# Patient Record
Sex: MALE | Race: WHITE | Employment: FULL TIME | URBAN - METROPOLITAN AREA
[De-identification: names, ages, dates, MRNs, and addresses within clinical notes are randomized per-mention and may not be internally consistent; named-entity substitution may affect disease eponyms.]

---

## 2018-08-16 PROBLEM — D37.4 NEOPLASM OF UNCERTAIN BEHAVIOR OF COLON: Status: ACTIVE | Noted: 2018-08-16

## 2018-08-16 PROBLEM — K64.4 RESIDUAL HEMORRHOIDAL SKIN TAGS: Status: ACTIVE | Noted: 2018-08-16

## 2019-08-14 ENCOUNTER — HOSPITAL ENCOUNTER (OUTPATIENT)
Age: 59
Setting detail: OUTPATIENT SURGERY
Discharge: HOME | End: 2019-08-14
Attending: ORTHOPAEDIC SURGERY | Admitting: ORTHOPAEDIC SURGERY
Payer: COMMERCIAL

## 2019-08-14 ENCOUNTER — ANESTHESIA EVENT (OUTPATIENT)
Dept: SURGERY | Age: 59
End: 2019-08-14
Payer: COMMERCIAL

## 2019-08-14 ENCOUNTER — ANESTHESIA (OUTPATIENT)
Dept: SURGERY | Age: 59
End: 2019-08-14
Payer: COMMERCIAL

## 2019-08-14 VITALS
DIASTOLIC BLOOD PRESSURE: 77 MMHG | TEMPERATURE: 97.5 F | BODY MASS INDEX: 25.84 KG/M2 | WEIGHT: 175 LBS | HEART RATE: 70 BPM | SYSTOLIC BLOOD PRESSURE: 122 MMHG | RESPIRATION RATE: 16 BRPM | OXYGEN SATURATION: 92 %

## 2019-08-14 PROCEDURE — 77030000032 HC CUF TRNQT ZIMM -B: Performed by: ORTHOPAEDIC SURGERY

## 2019-08-14 PROCEDURE — 76210000020 HC REC RM PH II FIRST 0.5 HR: Performed by: ORTHOPAEDIC SURGERY

## 2019-08-14 PROCEDURE — 77030002933 HC SUT MCRYL J&J -A: Performed by: ORTHOPAEDIC SURGERY

## 2019-08-14 PROCEDURE — 77030003666 HC NDL SPINAL BD -A: Performed by: ORTHOPAEDIC SURGERY

## 2019-08-14 PROCEDURE — 77030010509 HC AIRWY LMA MSK TELE -A: Performed by: ANESTHESIOLOGY

## 2019-08-14 PROCEDURE — 77030018836 HC SOL IRR NACL ICUM -A: Performed by: ORTHOPAEDIC SURGERY

## 2019-08-14 PROCEDURE — 77030038012 HC WND COBLATN S&N -F: Performed by: ORTHOPAEDIC SURGERY

## 2019-08-14 PROCEDURE — 77030019605: Performed by: ORTHOPAEDIC SURGERY

## 2019-08-14 PROCEDURE — 74011250636 HC RX REV CODE- 250/636: Performed by: ORTHOPAEDIC SURGERY

## 2019-08-14 PROCEDURE — 74011250637 HC RX REV CODE- 250/637: Performed by: ANESTHESIOLOGY

## 2019-08-14 PROCEDURE — 74011250636 HC RX REV CODE- 250/636: Performed by: ANESTHESIOLOGY

## 2019-08-14 PROCEDURE — 76210000006 HC OR PH I REC 0.5 TO 1 HR: Performed by: ORTHOPAEDIC SURGERY

## 2019-08-14 PROCEDURE — 74011000250 HC RX REV CODE- 250

## 2019-08-14 PROCEDURE — 74011250636 HC RX REV CODE- 250/636

## 2019-08-14 PROCEDURE — 76010000138 HC OR TIME 0.5 TO 1 HR: Performed by: ORTHOPAEDIC SURGERY

## 2019-08-14 PROCEDURE — 77030006668 HC BLD SHV MENSCS STRY -B: Performed by: ORTHOPAEDIC SURGERY

## 2019-08-14 PROCEDURE — 76060000032 HC ANESTHESIA 0.5 TO 1 HR: Performed by: ORTHOPAEDIC SURGERY

## 2019-08-14 RX ORDER — SODIUM CHLORIDE 0.9 % (FLUSH) 0.9 %
5-40 SYRINGE (ML) INJECTION EVERY 8 HOURS
Status: DISCONTINUED | OUTPATIENT
Start: 2019-08-14 | End: 2019-08-14 | Stop reason: HOSPADM

## 2019-08-14 RX ORDER — LIDOCAINE HYDROCHLORIDE 20 MG/ML
INJECTION, SOLUTION EPIDURAL; INFILTRATION; INTRACAUDAL; PERINEURAL AS NEEDED
Status: DISCONTINUED | OUTPATIENT
Start: 2019-08-14 | End: 2019-08-14 | Stop reason: HOSPADM

## 2019-08-14 RX ORDER — NALOXONE HYDROCHLORIDE 0.4 MG/ML
0.2 INJECTION, SOLUTION INTRAMUSCULAR; INTRAVENOUS; SUBCUTANEOUS AS NEEDED
Status: DISCONTINUED | OUTPATIENT
Start: 2019-08-14 | End: 2019-08-14 | Stop reason: HOSPADM

## 2019-08-14 RX ORDER — EPHEDRINE SULFATE 50 MG/ML
INJECTION, SOLUTION INTRAVENOUS AS NEEDED
Status: DISCONTINUED | OUTPATIENT
Start: 2019-08-14 | End: 2019-08-14 | Stop reason: HOSPADM

## 2019-08-14 RX ORDER — CLINDAMYCIN PHOSPHATE 900 MG/50ML
900 INJECTION INTRAVENOUS ONCE
Status: COMPLETED | OUTPATIENT
Start: 2019-08-14 | End: 2019-08-14

## 2019-08-14 RX ORDER — SODIUM CHLORIDE 0.9 % (FLUSH) 0.9 %
5-40 SYRINGE (ML) INJECTION AS NEEDED
Status: DISCONTINUED | OUTPATIENT
Start: 2019-08-14 | End: 2019-08-14 | Stop reason: HOSPADM

## 2019-08-14 RX ORDER — SODIUM CHLORIDE, SODIUM LACTATE, POTASSIUM CHLORIDE, CALCIUM CHLORIDE 600; 310; 30; 20 MG/100ML; MG/100ML; MG/100ML; MG/100ML
75 INJECTION, SOLUTION INTRAVENOUS CONTINUOUS
Status: DISCONTINUED | OUTPATIENT
Start: 2019-08-14 | End: 2019-08-14 | Stop reason: HOSPADM

## 2019-08-14 RX ORDER — ROPIVACAINE HYDROCHLORIDE 5 MG/ML
INJECTION, SOLUTION EPIDURAL; INFILTRATION; PERINEURAL AS NEEDED
Status: DISCONTINUED | OUTPATIENT
Start: 2019-08-14 | End: 2019-08-14 | Stop reason: HOSPADM

## 2019-08-14 RX ORDER — DEXAMETHASONE SODIUM PHOSPHATE 4 MG/ML
INJECTION, SOLUTION INTRA-ARTICULAR; INTRALESIONAL; INTRAMUSCULAR; INTRAVENOUS; SOFT TISSUE AS NEEDED
Status: DISCONTINUED | OUTPATIENT
Start: 2019-08-14 | End: 2019-08-14 | Stop reason: HOSPADM

## 2019-08-14 RX ORDER — FENTANYL CITRATE 50 UG/ML
INJECTION, SOLUTION INTRAMUSCULAR; INTRAVENOUS AS NEEDED
Status: DISCONTINUED | OUTPATIENT
Start: 2019-08-14 | End: 2019-08-14 | Stop reason: HOSPADM

## 2019-08-14 RX ORDER — MIDAZOLAM HYDROCHLORIDE 1 MG/ML
2 INJECTION, SOLUTION INTRAMUSCULAR; INTRAVENOUS
Status: DISCONTINUED | OUTPATIENT
Start: 2019-08-14 | End: 2019-08-14 | Stop reason: HOSPADM

## 2019-08-14 RX ORDER — HYDROMORPHONE HYDROCHLORIDE 2 MG/ML
0.5 INJECTION, SOLUTION INTRAMUSCULAR; INTRAVENOUS; SUBCUTANEOUS
Status: DISCONTINUED | OUTPATIENT
Start: 2019-08-14 | End: 2019-08-14 | Stop reason: HOSPADM

## 2019-08-14 RX ORDER — PROPOFOL 10 MG/ML
INJECTION, EMULSION INTRAVENOUS AS NEEDED
Status: DISCONTINUED | OUTPATIENT
Start: 2019-08-14 | End: 2019-08-14 | Stop reason: HOSPADM

## 2019-08-14 RX ORDER — OXYCODONE AND ACETAMINOPHEN 5; 325 MG/1; MG/1
1 TABLET ORAL AS NEEDED
Status: DISCONTINUED | OUTPATIENT
Start: 2019-08-14 | End: 2019-08-14 | Stop reason: HOSPADM

## 2019-08-14 RX ORDER — ONDANSETRON 2 MG/ML
INJECTION INTRAMUSCULAR; INTRAVENOUS AS NEEDED
Status: DISCONTINUED | OUTPATIENT
Start: 2019-08-14 | End: 2019-08-14 | Stop reason: HOSPADM

## 2019-08-14 RX ORDER — LIDOCAINE HYDROCHLORIDE 10 MG/ML
0.1 INJECTION INFILTRATION; PERINEURAL AS NEEDED
Status: DISCONTINUED | OUTPATIENT
Start: 2019-08-14 | End: 2019-08-14 | Stop reason: HOSPADM

## 2019-08-14 RX ADMIN — EPHEDRINE SULFATE 5 MG: 50 INJECTION, SOLUTION INTRAVENOUS at 07:40

## 2019-08-14 RX ADMIN — PROPOFOL 200 MG: 10 INJECTION, EMULSION INTRAVENOUS at 07:19

## 2019-08-14 RX ADMIN — FENTANYL CITRATE 50 MCG: 50 INJECTION, SOLUTION INTRAMUSCULAR; INTRAVENOUS at 07:19

## 2019-08-14 RX ADMIN — FENTANYL CITRATE 25 MCG: 50 INJECTION, SOLUTION INTRAMUSCULAR; INTRAVENOUS at 07:47

## 2019-08-14 RX ADMIN — FENTANYL CITRATE 25 MCG: 50 INJECTION, SOLUTION INTRAMUSCULAR; INTRAVENOUS at 07:32

## 2019-08-14 RX ADMIN — LIDOCAINE HYDROCHLORIDE 60 MG: 20 INJECTION, SOLUTION EPIDURAL; INFILTRATION; INTRACAUDAL; PERINEURAL at 07:19

## 2019-08-14 RX ADMIN — ONDANSETRON 4 MG: 2 INJECTION INTRAMUSCULAR; INTRAVENOUS at 07:37

## 2019-08-14 RX ADMIN — CLINDAMYCIN PHOSPHATE 900 MG: 900 INJECTION, SOLUTION INTRAVENOUS at 07:25

## 2019-08-14 RX ADMIN — DEXAMETHASONE SODIUM PHOSPHATE 10 MG: 4 INJECTION, SOLUTION INTRA-ARTICULAR; INTRALESIONAL; INTRAMUSCULAR; INTRAVENOUS; SOFT TISSUE at 07:27

## 2019-08-14 RX ADMIN — OXYCODONE HYDROCHLORIDE AND ACETAMINOPHEN 1 TABLET: 5; 325 TABLET ORAL at 08:21

## 2019-08-14 RX ADMIN — SODIUM CHLORIDE, SODIUM LACTATE, POTASSIUM CHLORIDE, AND CALCIUM CHLORIDE 75 ML/HR: 600; 310; 30; 20 INJECTION, SOLUTION INTRAVENOUS at 06:15

## 2019-08-14 NOTE — ANESTHESIA PREPROCEDURE EVALUATION
Relevant Problems   No relevant active problems       Anesthetic History               Review of Systems / Medical History  Patient summary reviewed, nursing notes reviewed and pertinent labs reviewed    Pulmonary  Within defined limits                 Neuro/Psych              Cardiovascular  Within defined limits                Exercise tolerance: >4 METS     GI/Hepatic/Renal  Within defined limits              Endo/Other  Within defined limits           Other Findings              Physical Exam    Airway  Mallampati: II  TM Distance: 4 - 6 cm  Neck ROM: normal range of motion   Mouth opening: Normal     Cardiovascular    Rhythm: regular           Dental  No notable dental hx       Pulmonary  Breath sounds clear to auscultation               Abdominal         Other Findings            Anesthetic Plan    ASA: 1  Anesthesia type: general            Anesthetic plan and risks discussed with: Patient and Spouse

## 2019-08-14 NOTE — H&P
Outpatient Surgery History and Physical:  Mendel Garnet Frames was seen and examined. CHIEF COMPLAINT:   Left knee pain. PE:     Visit Vitals  /76 (BP 1 Location: Right arm, BP Patient Position: Sitting)   Pulse (!) 55   Temp 98 °F (36.7 °C)   Resp 18   Wt 79.4 kg (175 lb)   SpO2 96%   BMI 25.84 kg/m²       Heart:   Regular rhythm      Lungs:  Are clear      Past Medical History:    Patient Active Problem List    Diagnosis    Residual hemorrhoidal skin tags    Neoplasm of uncertain behavior of colon    Allergic rhinitis, seasonal    Testicular asymmetry     right      Colon polyp    Melanoma of plantar aspect of foot ([address]     bilateral      H/O psoriasis     treated with Embrel and resolved         Surgical History:   Past Surgical History:   Procedure Laterality Date    HX COLONOSCOPY  2016    repeat 2021    HX COLONOSCOPY      HX MALIGNANT SKIN LESION EXCISION  2015    plantar    HX WISDOM TEETH EXTRACTION         Social History: Patient  reports that he has never smoked. He has never used smokeless tobacco. He reports that he does not drink alcohol or use drugs. Family History:   Family History   Problem Relation Age of Onset    Pacemaker Mother     Thyroid Disease Mother     Heart Surgery Father         CABG    Stroke Father     No Known Problems Sister     Heart Surgery Brother 37        CABG    No Known Problems Sister     Colon Cancer Neg Hx     Prostate Cancer Neg Hx     Breast Cancer Neg Hx        Allergies: Reviewed per EMR  Allergies   Allergen Reactions    Shellfish Derived Anaphylaxis    Penicillins Rash       Medications:    No current facility-administered medications on file prior to encounter. Current Outpatient Medications on File Prior to Encounter   Medication Sig    diclofenac EC (VOLTAREN) 75 mg EC tablet Take 1 Tab by mouth two (2) times a day.  cetirizine (ZYRTEC) 10 mg tablet Take  by mouth.     diphenhydrAMINE (BENADRYL) 25 mg capsule Take 25 mg by mouth every six (6) hours as needed.  triamcinolone acetonide (KENALOG) 0.1 % topical cream Apply  to affected area two (2) times daily as needed for Skin Irritation. use thin layer   Indications: PLAQUE PSORIASIS    LORATADINE (CLARITIN PO) Take  by mouth. The surgery is planned for the left knee arthroscopy with partial medial menisectomy. History and physical has been reviewed. The patient has been examined. There have been no significant clinical changes since the completion of the originally dated History and Physical.  Patient identified by surgeon; surgical site was confirmed by patient and surgeon. The patient is here today for outpatient surgery. I have examined the patient, no changes are noted in the patient's medical status. Necessity for the procedure/care is still present and the history and physical above is current. See the office notes for the full long term history of the problem. Please see the recent office notes for the musculoskeletal examination.     Signed By: Gilberto Rich MD     August 14, 2019 7:02 AM

## 2019-08-14 NOTE — ANESTHESIA POSTPROCEDURE EVALUATION
Procedure(s):  LEFT KNEE ARTHROSCOPY / PARTIAL MEDIAL MENISCECTOMY.     general    Anesthesia Post Evaluation      Multimodal analgesia: multimodal analgesia used between 6 hours prior to anesthesia start to PACU discharge  Patient location during evaluation: PACU  Patient participation: complete - patient participated  Level of consciousness: awake and alert  Pain management: adequate  Airway patency: patent  Anesthetic complications: no  Cardiovascular status: acceptable  Respiratory status: acceptable  Hydration status: acceptable  Post anesthesia nausea and vomiting:  none      Vitals Value Taken Time   /77 8/14/2019  8:45 AM   Temp 36.4 °C (97.5 °F) 8/14/2019  8:03 AM   Pulse 70 8/14/2019  8:45 AM   Resp 16 8/14/2019  8:45 AM   SpO2 92 % 8/14/2019  8:45 AM

## 2019-08-14 NOTE — OP NOTES
Operative Note    8/14/2019     Preoperative diagnosis:  Degenerative tear of left medial meniscus [M23.204]  Loose body of left knee [M23.42]  Swelling of left knee joint [M25.462]    Postoperative diagnosis: PARTIAL MEDIAL MENISCUS TEAR    Surgeon(s) and Role:     Anibal Sandy MD - Primary     Assistant: none      Anesthesia: General    Tourniquet Time:   Total Tourniquet Time Documented:  Thigh (Left) - 22 minutes  Total: Thigh (Left) - 22 minutes     At 250 mmHg. Antibiotics: cleocin 900 mg IV    Procedures:  Procedure(s):  LEFT KNEE ARTHROSCOPY / PARTIAL MEDIAL MENISCECTOMY/CHONDROPLASTY  04094    Findings:  1. EUA -   - lachman's and - pivot shift. Stable to varus and valgus. 2. PFJ - Normal, The medial and lateral gutters did have some small chondral debrie. 3. Medial Joint - complex medial meniscus tear, cartilage appeared fairly normal on the tibia but the weightbearing medial femoral condyle was noted to have GII-III changes  4. Lateral joint - The lateral joint carilage appeared normal except for a small area of GI change on the medial most lateral tibial plateau. The lateral meniscus was stable to probing and showed no tears present. 5. PCL - stable and intact  6. ACL -  stable and intact   7. No loose bodies. Indications / Consent:   this is a patient with symptoms compatible with a medial meniscus tear. After previous discussions and treatments using both conservative and/or non-operative treatment options the patient elected to proceed with surgery due to continued symptoms. A review of the risks and benefits, including but not limited to infection, stiffness, injury to nerves and vessels, DVT, PE, MI, need for further operations and other anesthesia related risks was performed with the patient. After this review and the review of the likely outcome and potential complications of the procedure, preoperative verbal and written consents were obtained.   The operative procedure and postoperative course were discussed with the patient in detail and the extremity was marked by the patient and myself. Procedure:     the patient was given their anesthetic, placed in a supine position, an EUA was performed and noted above. A lateral post was placed adjacent to the operative leg. The contralateral leg was padded appropriately and lay on the operating table. The surgical leg was prepped with ChloraPrep and draped in the standard fashion. Prior to the beginning of the procedure, a time-out was performed for correct surgical site identification as was marked during the pre-operative meeting. This was confirmed using the written consent and history/physical. Time-out for antibiotic dosing, timing and selection was also performed. An esmarch was used to exsanguinate the leg and the tourniquet was inflated to 250 mmhg. An incision was made and the scope was introduced anterolaterally and an anteromedial portal was developed with the use of spinal needle localization. The patellofemoral joint was viewed and the articular surfaces were normal.  The medial and lateral gutters were reviewed and they were noted to have small chondral debrie. The medial compartment was viewed and the articular surfaces were noted. A chondroplasty was performed on the medial femoral condyle. The medial meniscus was probed and a complex type tear was present. It involved 20% of the meniscus. The notch was viewed and the ACL and PCL were normal.  The lateral compartment was viewed and probed and the articular surface and lateral meniscus was normal.  Attention was turned back to the medial compartment. The medial meniscus was resected and balanced with manual instruments and motorized alessia until the rim was probed and felt to be stable. The posteromedial compartment was viewed and no further abnormalities were noted. The scope was then placed superiorly.   Any other loose bits of meniscal debris were removed from the joint. Local anesthetic was injected, 15 ml of Naropin, at the portal sites and intra-articularly. Monofilament sutures were used to close the portals and a sterile Allevyn dressing and an Ace wrap were placed on the knee. The tourniquet was deflated at  22 min. The patient was returned to the recovery room in a satisfactory condition. Post-operative plan: Patient will work on ROM and may progress weightbearing as they feel comfortable. They will start PT for 2 or more visits depending on their progress. Otherwise the patient may refer to post op instructions for wound care and progression of activities. Enteric Aspirin was discussed for DVT prophylaxis. Will follow up in 1 week for wound check and post op review. Pain medications have been provided. Estimated Blood Loss:   minimal    Fluids:    See anesthesia record.     Implant: * No implants in log *    Closure: Primary    Complications: None    Signed By: José Miguel Izaguirre MD

## 2019-08-14 NOTE — BRIEF OP NOTE
BRIEF OPERATIVE NOTE    Date of Procedure: 8/14/2019   Preoperative Diagnosis: Degenerative tear of left medial meniscus [M23.204]  Loose body of left knee [M23.42]  Swelling of left knee joint [M25.462]  Postoperative Diagnosis: left knee  PARTIAL MEDIAL MENISCUS TEAR    Procedure(s):  LEFT KNEE ARTHROSCOPY / PARTIAL MEDIAL MENISCECTOMY/CHONDROPLASTY  Surgeon(s) and Role:     Smooth Vargas MD - Primary         Surgical Assistant:     Surgical Staff:  Circ-1: Ilana Aguayo  Circ-2: Rob Ventura RN  Scrub Tech-1: Emile He  Event Time In Time Out   Incision Start 3787    Incision Close 0751      Anesthesia: General   Estimated Blood Loss: minimal  Specimens: * No specimens in log *   Findings: Left knee medial meniscus tear, chondromalacia.     Complications: none  Implants: * No implants in log *

## 2019-08-14 NOTE — DISCHARGE INSTRUCTIONS
Postoperative Instructions - Knee Arthroscopy    Please note these are general instructions for postoperative care. Specific instructions may be given regarding the type of surgery that you have undergone. 1. Postoperative dressings may be removed after 2 to 3 days. Dressings should be kept dry for the first several days. Following removal of the dressing, wounds should be kept dry when showering. A large trash bag taped to the thigh can work well. Do not soak wounds in the tub prior to suture removal.      2. All steri-strip tapes across the wound should be left in place if your incisions have them. 3. Some discomfort is expected following any operation. You will be given a prescription for pain medication along with instructions for its use. Many pain medications contain Tylenol. Do not take additional Tylenol if you are currently taking the prescribed pain medications. Do not drive while taking pain medications. Do not make important personal or business decisions or sign legal documents the first 24 hours after surgery. If your pain is not adequately controlled, contact your surgeon on call at the numbers on this sheet. 4. Following simple knee arthroscopy crutches are not usually utilized or are used for only 1 or 2 days. Working on regaining full motion of the knee is encouraged. Bending and straightening the knee and performing ankle range of motion is encouraged to prevent blood clots. Try to use a stationary bike without resistance within the first week of surgery to help regain motion. 5. Elevating the lower extremity after surgery is helpful in the first few days postoperatively. This can help control swelling. Applying ice for 15 to 20 minutes per hour to the surgical site is often helpful in decreasing pain and swelling.     6. As pain subsides, discontinuation of narcotic medication is recommended and switching to Tylenol or over the counter anti-inflammatory medication is desirable. 7. Pain medications can cause constipation, nausea and vomiting, and sometimes itching and hives. Keeping hydrated by taking liquids on a regular basis can help. For constipation, consider over the counter additions like Metamucil or an over-the-counter stool softner. Trying to limit narcotic use can often help with regards to nausea and vomiting. Taking pain medication with a small amount of food is also usually helpful. Itching and hives can occur with pain medication as well as antibiotics that are given during the felecia-operative time. Over the counter Benadryl (25mg every 6 hours) can be helpful in treating itching. 8. If you feel you are progressing slowly, feel free to call our office to get you into physical therapy if you do not already have a prescription. 9. Any fevers (101º or greater) after 2-3 days post-op, increasing redness, drainage, or steadily increasing pain please notify us or come in as soon as possible. 10. Deep vein thrombosis (DVT) is a condition in which a blood clot has formed in a deep vein of the leg. This may result in redness, swelling, warmth and/or pain of the leg. Although these are very rare, there are things that can be done to lessen the risk. - It is recommended by your surgeon unless indicated otherwise, after knee arthroscopy; take an adult aspirin once a day for three weeks after surgery to help prevent the formation of blood clots. (Do not take aspirin against the advice of your medical doctor). -   For several days (at least until you are walking about most of the day), rotate your  ankle, bend your toes and move your foot back and forth and from side to side, frequently (at least a few minutes every hour while you are awake) in order to keep blood circulation flowing so that the blood clotting will be less likely to occur. The more circulation, the less chance of blood clotting and a DVT.     If you call the office please have us paged instead of leaving a voice mail so that we can immediately take care of your needs. Phone [phone]              Fax [phone]                               ACTIVITY  · As tolerated and as directed by your doctor. · Bathe or shower as directed by your doctor. DIET  · Clear liquids until no nausea or vomiting; then light diet for the first day. · Advance to regular diet on second day, unless your doctor orders otherwise. · If nausea and vomiting continues, call your doctor. PAIN  · Take pain medication as directed by your doctor. · Call your doctor if pain is NOT relieved by medication. · DO NOT take aspirin of blood thinners unless directed by your doctor. DRESSING CARE       CALL YOUR DOCTOR IF   · Excessive bleeding that does not stop after holding pressure over the area  · Temperature of 101 degrees F or above  · Excessive redness, swelling or bruising, and/ or green or yellow, smelly discharge from incision    AFTER ANESTHESIA   · For the first 24 hours: DO NOT Drive, Drink alcoholic beverages, or Make important decisions. · Be aware of dizziness following anesthesia and while taking pain medication. APPOINTMENT DATE/ TIME    YOUR DOCTOR'S PHONE NUMBER       DISCHARGE SUMMARY from Nurse    PATIENT INSTRUCTIONS:    After general anesthesia or intravenous sedation, for 24 hours or while taking prescription Narcotics:  · Limit your activities  · Do not drive and operate hazardous machinery  · Do not make important personal or business decisions  · Do  not drink alcoholic beverages  · If you have not urinated within 8 hours after discharge, please contact your surgeon on call. *  Please give a list of your current medications to your Primary Care Provider. *  Please update this list whenever your medications are discontinued, doses are      changed, or new medications (including over-the-counter products) are added.     *  Please carry medication information at all times in case of emergency situations. These are general instructions for a healthy lifestyle:    No smoking/ No tobacco products/ Avoid exposure to second hand smoke    Surgeon General's Warning:  Quitting smoking now greatly reduces serious risk to your health. Obesity, smoking, and sedentary lifestyle greatly increases your risk for illness    A healthy diet, regular physical exercise & weight monitoring are important for maintaining a healthy lifestyle    You may be retaining fluid if you have a history of heart failure or if you experience any of the following symptoms:  Weight gain of 3 pounds or more overnight or 5 pounds in a week, increased swelling in our hands or feet or shortness of breath while lying flat in bed. Please call your doctor as soon as you notice any of these symptoms; do not wait until your next office visit. Recognize signs and symptoms of STROKE:    F-face looks uneven    A-arms unable to move or move unevenly    S-speech slurred or non-existent    T-time-call 911 as soon as signs and symptoms begin-DO NOT go       Back to bed or wait to see if you get better-TIME IS BRAIN.

## 2022-03-19 PROBLEM — K64.4 RESIDUAL HEMORRHOIDAL SKIN TAGS: Status: ACTIVE | Noted: 2018-08-16

## 2022-03-19 PROBLEM — D37.4 NEOPLASM OF UNCERTAIN BEHAVIOR OF COLON: Status: ACTIVE | Noted: 2018-08-16

## 2023-09-29 ENCOUNTER — OFFICE VISIT (OUTPATIENT)
Dept: ORTHOPEDIC SURGERY | Age: 63
End: 2023-09-29

## 2023-09-29 DIAGNOSIS — M05.742 RHEUMATOID ARTHRITIS INVOLVING BOTH HANDS WITH POSITIVE RHEUMATOID FACTOR (HCC): ICD-10-CM

## 2023-09-29 DIAGNOSIS — M65.341 TRIGGER FINGER, RIGHT RING FINGER: Primary | ICD-10-CM

## 2023-09-29 DIAGNOSIS — M65.342 TRIGGER FINGER, LEFT RING FINGER: ICD-10-CM

## 2023-09-29 DIAGNOSIS — M05.741 RHEUMATOID ARTHRITIS INVOLVING BOTH HANDS WITH POSITIVE RHEUMATOID FACTOR (HCC): ICD-10-CM

## 2023-09-29 RX ORDER — MELOXICAM 15 MG/1
15 TABLET ORAL DAILY
Qty: 30 TABLET | Refills: 0 | Status: SHIPPED | OUTPATIENT
Start: 2023-09-29 | End: 2023-10-29

## 2023-09-29 RX ORDER — BETAMETHASONE SODIUM PHOSPHATE AND BETAMETHASONE ACETATE 3; 3 MG/ML; MG/ML
6 INJECTION, SUSPENSION INTRA-ARTICULAR; INTRALESIONAL; INTRAMUSCULAR; SOFT TISSUE ONCE
Status: COMPLETED | OUTPATIENT
Start: 2023-09-29 | End: 2023-09-29

## 2023-09-29 RX ADMIN — BETAMETHASONE SODIUM PHOSPHATE AND BETAMETHASONE ACETATE 6 MG: 3; 3 INJECTION, SUSPENSION INTRA-ARTICULAR; INTRALESIONAL; INTRAMUSCULAR; SOFT TISSUE at 08:05

## 2023-09-29 NOTE — PROGRESS NOTES
Orthopaedic Hand Surgery Note    Name: Lilli Moseley  Age: 60 y.o. YOB: 1960  Gender: male  MRN: [id number]    CC: New patient referred for hand numbness    HPI: Patient is a 60 y.o. male right hand dominant with a chief complaint of bilateral ring clicking, locking and pain. The symptoms have been going on for at least 6 months ago, patient does have a diagnosis of rheumatoid arthritis treated by Dr. Mateo Cain with sulfasalazine, methotrexate and recently started Enbrel. The current symptoms are rated a 7/10 and interfere with ADLs. ROS/Meds/PSH/PMH/FH/SH: I personally reviewed the patients standard intake form. Pertinents are discussed in the HPI    Physical Examination:  General: Awake and alert. HEENT: Normocephalic, atraumatic  CV/Pulm: Breathing even and unlabored  Skin: No obvious rashes noted. Lymphatic: No obvious evidence of lymphedema or lymphadenopathy    Musculoskeletal:   Examination on the bilateral demonstrates Normal sensation to light touch in the median distribution, normal sensation in ulnar and radial distribution, Negative carpal tunnel compression testing and Phalen testing. Positive tenderness of the ring A1 pulley with palpable clicking and Positive  locking. The extensor tendons all track well over the MCP joints. Imaging / Electrodiagnostic Tests:     none    Assessment:   1. Trigger finger, right ring finger    2. Trigger finger, left ring finger    3. Rheumatoid arthritis involving both hands with positive rheumatoid factor ([address])        Plan:  We discussed the diagnosis and different treatment options. We discussed observation, splinting, cortisone injections and surgical release of the A1 pulley.  We discussed that stenosing tenosynovitis at the level of the A1 pulley AKA trigger finger is a chronic condition regardless of how long the symptoms have been present, this most likely has been progressing for much longer than the symptoms were evident and it will

## 2023-11-29 ENCOUNTER — OFFICE VISIT (OUTPATIENT)
Dept: ORTHOPEDIC SURGERY | Age: 63
End: 2023-11-29
Payer: COMMERCIAL

## 2023-11-29 DIAGNOSIS — M05.741 RHEUMATOID ARTHRITIS INVOLVING BOTH HANDS WITH POSITIVE RHEUMATOID FACTOR (HCC): ICD-10-CM

## 2023-11-29 DIAGNOSIS — M65.341 TRIGGER FINGER, RIGHT RING FINGER: Primary | ICD-10-CM

## 2023-11-29 DIAGNOSIS — M65.342 TRIGGER FINGER, LEFT RING FINGER: ICD-10-CM

## 2023-11-29 DIAGNOSIS — M05.742 RHEUMATOID ARTHRITIS INVOLVING BOTH HANDS WITH POSITIVE RHEUMATOID FACTOR (HCC): ICD-10-CM

## 2023-11-29 PROCEDURE — 99213 OFFICE O/P EST LOW 20 MIN: CPT | Performed by: ORTHOPAEDIC SURGERY

## 2023-11-29 NOTE — PROGRESS NOTES
Orthopaedic Hand Surgery Note    Name: Aidee Moseley  Age: 60 y.o. YOB: 1960  Gender: male  MRN: [id number]    CC: Follow up for trigger finger    HPI: Patient is a 60 y.o. male who returns today for reevaluation of a bilateral ring trigger finger. Last visit we provided steroid injections, patient reports symptoms improved to 100%, the left ring finger is bothering him a little bit but it has not locked or clicked. ROS/Meds/PSH/PMH/FH/SH: I personally reviewed the patients standard intake form. Pertinents are discussed in the HPI    Physical Examination:  General: Awake and alert. HEENT: Normocephalic, atraumatic  CV/Pulm: Breathing even and unlabored  Skin: No obvious rashes noted. Lymphatic: No obvious evidence of lymphedema or lymphadenopathy    Musculoskeletal:   Examination on the bilateral upper extremity demonstrates, Normal sensation and good cap refill in all fingers, Positive tenderness over the left ring A1 pulley with palpable clicking and Negative  locking. Imaging / Electrodiagnostic Tests:     none    Assessment:   1. Trigger finger, right ring finger    2. Trigger finger, left ring finger    3. Rheumatoid arthritis involving both hands with positive rheumatoid factor ([address])        Plan:  We discussed the diagnosis and different treatment options. We discussed observation, splinting, cortisone injections and surgical release of the A1 pulley. We discussed that stenosing tenosynovitis at the level of the A1 pulley AKA trigger finger is a chronic condition regardless of how long the symptoms have been present, this most likely has been progressing for much longer than the symptoms were evident and it will likely persist for a long time without medical treatment. Furthermore, the vast majority of patients require surgical release at some point despite some short-term benefits of conservative treatment.   After discussing in detail the patient elects to proceed with

## 2024-01-24 ENCOUNTER — HOSPITAL ENCOUNTER (EMERGENCY)
Age: 64
Discharge: HOME | End: 2024-01-24
Payer: COMMERCIAL

## 2024-01-24 ENCOUNTER — APPOINTMENT (OUTPATIENT)
Dept: GENERAL RADIOLOGY | Age: 64
End: 2024-01-24
Payer: COMMERCIAL

## 2024-01-24 VITALS
BODY MASS INDEX: 29.03 KG/M2 | WEIGHT: 185 LBS | SYSTOLIC BLOOD PRESSURE: 98 MMHG | OXYGEN SATURATION: 96 % | HEIGHT: 67 IN | TEMPERATURE: 97.3 F | DIASTOLIC BLOOD PRESSURE: 55 MMHG | RESPIRATION RATE: 18 BRPM | HEART RATE: 54 BPM

## 2024-01-24 DIAGNOSIS — S42.202A CLOSED FRACTURE OF PROXIMAL END OF LEFT HUMERUS, UNSPECIFIED FRACTURE MORPHOLOGY, INITIAL ENCOUNTER: Primary | ICD-10-CM

## 2024-01-24 PROCEDURE — 73030 X-RAY EXAM OF SHOULDER: CPT

## 2024-01-24 PROCEDURE — 99283 EMERGENCY DEPT VISIT LOW MDM: CPT

## 2024-01-24 PROCEDURE — 6370000000 HC RX 637 (ALT 250 FOR IP)

## 2024-01-24 RX ORDER — ONDANSETRON 4 MG/1
4 TABLET, ORALLY DISINTEGRATING ORAL 3 TIMES DAILY PRN
Qty: 20 TABLET | Refills: 0 | Status: SHIPPED | OUTPATIENT
Start: 2024-01-24

## 2024-01-24 RX ORDER — OXYCODONE HYDROCHLORIDE AND ACETAMINOPHEN 5; 325 MG/1; MG/1
1 TABLET ORAL EVERY 6 HOURS PRN
Qty: 20 TABLET | Refills: 0 | Status: ON HOLD | OUTPATIENT
Start: 2024-01-24 | End: 2024-01-26

## 2024-01-24 RX ORDER — ONDANSETRON 4 MG/1
4 TABLET, ORALLY DISINTEGRATING ORAL
Status: COMPLETED | OUTPATIENT
Start: 2024-01-24 | End: 2024-01-24

## 2024-01-24 RX ORDER — OXYCODONE HYDROCHLORIDE AND ACETAMINOPHEN 5; 325 MG/1; MG/1
1 TABLET ORAL
Status: COMPLETED | OUTPATIENT
Start: 2024-01-24 | End: 2024-01-24

## 2024-01-24 RX ORDER — FOLIC ACID 1 MG/1
1 TABLET ORAL DAILY
COMMUNITY

## 2024-01-24 RX ADMIN — OXYCODONE AND ACETAMINOPHEN 1 TABLET: 5; 325 TABLET ORAL at 17:10

## 2024-01-24 RX ADMIN — ONDANSETRON 4 MG: 4 TABLET, ORALLY DISINTEGRATING ORAL at 17:11

## 2024-01-24 ASSESSMENT — ENCOUNTER SYMPTOMS
NAUSEA: 0
ABDOMINAL PAIN: 0
DIARRHEA: 0
COLOR CHANGE: 0
CHEST TIGHTNESS: 0
SHORTNESS OF BREATH: 0
VOMITING: 0
WHEEZING: 0

## 2024-01-24 ASSESSMENT — PAIN DESCRIPTION - LOCATION
LOCATION: SHOULDER
LOCATION: ARM;SHOULDER

## 2024-01-24 ASSESSMENT — PAIN DESCRIPTION - ORIENTATION
ORIENTATION: LEFT
ORIENTATION: LEFT

## 2024-01-24 ASSESSMENT — PAIN SCALES - GENERAL
PAINLEVEL_OUTOF10: 9
PAINLEVEL_OUTOF10: 9

## 2024-01-24 ASSESSMENT — PAIN - FUNCTIONAL ASSESSMENT: PAIN_FUNCTIONAL_ASSESSMENT: 0-10

## 2024-01-24 ASSESSMENT — LIFESTYLE VARIABLES
HOW MANY STANDARD DRINKS CONTAINING ALCOHOL DO YOU HAVE ON A TYPICAL DAY: PATIENT DOES NOT DRINK
HOW OFTEN DO YOU HAVE A DRINK CONTAINING ALCOHOL: NEVER

## 2024-01-24 NOTE — ED PROVIDER NOTES
Swelling, tenderness and deformity present.      Cervical back: Normal range of motion and neck supple.      Comments: Significant soft tissue swelling noted to the left shoulder girdle and proximal humerus.  Patient unable to tolerate range of motion at the shoulder.  Still pulses and sensation are intact.   Skin:     General: Skin is warm and dry.   Neurological:      General: No focal deficit present.      Mental Status: He is alert and oriented to person, place, and time.   Psychiatric:         Mood and Affect: Mood normal.         Behavior: Behavior normal.          Procedures     Procedures    Orders Placed This Encounter   Procedures    XR SHOULDER LEFT (MIN 2 VIEWS)        Medications given during this emergency department visit:  Medications   oxyCODONE-acetaminophen (PERCOCET) 5-325 MG per tablet 1 tablet (1 tablet Oral Given 1/24/24 1710)   ondansetron (ZOFRAN-ODT) disintegrating tablet 4 mg (4 mg Oral Given 1/24/24 1711)       Discharge Medication List as of 1/24/2024  5:04 PM        START taking these medications    Details   ondansetron (ZOFRAN-ODT) 4 MG disintegrating tablet Take 1 tablet by mouth 3 times daily as needed for Nausea or Vomiting, Disp-20 tablet, R-0Normal      oxyCODONE-acetaminophen (PERCOCET) 5-325 MG per tablet Take 1 tablet by mouth every 6 hours as needed for Pain for up to 5 days. Intended supply: 3 days. Take lowest dose possible to manage pain Max Daily Amount: 4 tablets, Disp-20 tablet, R-0Normal              Past Medical History:   Diagnosis Date    Adverse effect of anesthesia     patient stated after peridontal work in the office, sedation, passed out at home    Allergic rhinitis     Allergic rhinitis, seasonal     Arthritis     Colon polyp 2016    Fungal infection     H/O psoriasis 2015    treated with Embrel and resolved    Hypercholesteremia     Melanoma of plantar aspect of foot ([facility]) 2015    bilateral    Normal cardiac stress test 2000    RA (rheumatoid arthritis) ([facility])

## 2024-01-24 NOTE — DISCHARGE INSTRUCTIONS
CC: RIGHT shoulder pain    Current HPI:  Patient presents today for follow-up evaluation of right shoulder pain.  She has been compliant with formal physical therapy for the past 2 months.  She also received a steroid injection in May which she states provided relief for only 1-2 weeks and has since worn off.  Patient has also tried topical Voltaren gel which only provides some relief.  She would like to discuss further management options.    Previous HPI (5/19/2020): 68 y.o. Female with a history of right shoulder pain of about 3 weeks in duration.  She states that the pain is severe and not responding to any conservative care.      She reports that the pain and weakness is worse with overhead activity and reaching behind. It also bothers her at night.    Is affecting ADLs.  Pain is 8/10 at it's worst. Currently 2-3/10    Denies any CSI or PT for the shoulder. She is RHD.    Hx of left shoulder arthroscopic RTC repair in 01/2010 by Dr. Pickens. No major complaints today      Past Medical History:   Diagnosis Date    Bronchitis     seasonal    Cataract     Diverticulitis     Dry eye syndrome     GERD (gastroesophageal reflux disease)     Hyperlipidemia     Hypertension     Hypothyroidism 7/19/2012    Obese     PONV (postoperative nausea and vomiting)     Thyroid disease        Past Surgical History:   Procedure Laterality Date    BACK SURGERY      CHOLECYSTECTOMY      COLONOSCOPY  2007    diverticulosis    DE QUERVAIN'S RELEASE Left 03/2017    HERNIA REPAIR      HYSTERECTOMY      NASAL SEPTUM SURGERY      SHOULDER SURGERY      TONSILLECTOMY         Family History   Problem Relation Age of Onset    Cataracts Mother     Breast cancer Mother     Diabetes Mother     Hypertension Mother     Heart failure Mother     Heart disease Mother     Cataracts Father     Hypertension Father     Stroke Father     No Known Problems Daughter     No Known Problems Son     Diabetes Paternal Grandmother  X-ray did reveal a proximal humerus fracture as discussed.  I have discussed your case with our orthopedic consultants of [facility].  Their office should call tomorrow to schedule an appointment with Dr. Murry.  His contact information is provided above should you not hear from them.  Prescription for pain and nausea medication sent to pharmacy as requested.  Please note the oxycodone may make you feel drowsy so do not take this if you plan to drive or go to work.    Please return with any worsening symptoms or concerns in the interim.       Hyperlipidemia Sister     Arthritis Sister     Hyperlipidemia Brother     Arthritis Brother     No Known Problems Son     Amblyopia Neg Hx     Blindness Neg Hx     Glaucoma Neg Hx     Macular degeneration Neg Hx     Retinal detachment Neg Hx     Strabismus Neg Hx     Ovarian cancer Neg Hx     Melanoma Neg Hx     Celiac disease Neg Hx     Colon cancer Neg Hx     Colon polyps Neg Hx     Esophageal cancer Neg Hx     Liver cancer Neg Hx     Liver disease Neg Hx     Rectal cancer Neg Hx     Stomach cancer Neg Hx          Current Outpatient Medications:     acetaminophen/diphenhydramine (TYLENOL PM ORAL), Take by mouth., Disp: , Rfl:     acetic acid-hydrocortisone (VOSOL-HC) otic solution, 2-4 drops to affected ear twice a day, Disp: 10 mL, Rfl: 2    albuterol 90 mcg/actuation inhaler, Inhale 2 puffs into the lungs every 6 (six) hours as needed for Wheezing., Disp: 6.7 g, Rfl: 11    amLODIPine (NORVASC) 2.5 MG tablet, TAKE ONE TABLET BY MOUTH EVERY DAY, Disp: 30 tablet, Rfl: 11    dicyclomine (BENTYL) 10 MG capsule, Take 1 capsule (10 mg total) by mouth 3 (three) times daily., Disp: 90 capsule, Rfl: 5    fluocinonide (LIDEX) 0.05 % external solution, APPLY TO SCALP ONCE DAILY AS NEEDED FOR FLARE, Disp: 60 mL, Rfl: 3    fluticasone propionate (FLONASE) 50 mcg/actuation nasal spray, 1 spray by Each Nostril route once daily., Disp: , Rfl:     ketoconazole (NIZORAL) 2 % shampoo, APPLY TO DAMP SCALP EVERY OTHER DAY, LATHER AND LET SIT 5 MINUTES BEFORE RINSING, Disp: 120 mL, Rfl: 5    levothyroxine (SYNTHROID) 75 MCG tablet, TAKE ONE TABLET BY MOUTH EVERY DAY, Disp: 30 tablet, Rfl: 11    meloxicam (MOBIC) 7.5 MG tablet, TAKE ONE TABLET BY MOUTH EVERY 12 HOURS, Disp: 60 tablet, Rfl: 11    Multi-Vitamin tablet, Take by mouth.  Tablet Oral , Disp: , Rfl:     omeprazole (PRILOSEC) 40 MG capsule, TAKE ONE CAPSULE BY MOUTH EVERY DAY, Disp: 30 capsule, Rfl: 11    oxybutynin (DITROPAN-XL) 10 MG 24  hr tablet, TAKE ONE TABLET BY MOUTH EVERY DAY, Disp: 30 tablet, Rfl: 11    PSYLLIUM SEED, WITH SUGAR, (METAMUCIL ORAL), Take by mouth., Disp: , Rfl:     RESTASIS 0.05 % ophthalmic emulsion, PLACE 1 DROP IN EACH EYE TWO TIMES A DAY, Disp: 60 each, Rfl: 12    rosuvastatin (CRESTOR) 40 MG Tab, Take 1 tablet (40 mg total) by mouth once daily., Disp: 90 tablet, Rfl: 3    sulfamethoxazole-trimethoprim 800-160mg (BACTRIM DS) 800-160 mg Tab, Take 1 tablet by mouth 2 (two) times daily., Disp: 14 tablet, Rfl: 0    triamcinolone acetonide 0.1% (KENALOG) 0.1 % cream, AAA bid, Disp: 60 g, Rfl: 3    cetirizine (ZYRTEC) 10 MG tablet, Take 1 tablet (10 mg total) by mouth once daily., Disp: , Rfl: 0    cholestyramine (QUESTRAN) 4 gram packet, Take 1 packet (4 g total) by mouth 2 (two) times daily., Disp: 180 packet, Rfl: 0    diclofenac sodium 1 % Gel, Apply 2 g topically 4 (four) times daily. for 10 days, Disp: 1 Tube, Rfl: 5    ranitidine (ZANTAC) 150 MG tablet, TAKE ONE TABLET BY MOUTH EVERY EVENING, Disp: 30 tablet, Rfl: 5    Review of patient's allergies indicates:   Allergen Reactions    Erythromycin (bulk) Nausea And Vomiting    Levaquin [levofloxacin]      Other reaction(s): Swelling          REVIEW OF SYSTEMS:  Constitution: Negative. Negative for chills, fever and night sweats.   HENT: Negative for congestion and headaches.    Eyes: Negative for blurred vision, left vision loss and right vision loss.   Cardiovascular: Negative for chest pain and syncope.   Respiratory: Negative for cough and shortness of breath.    Endocrine: Negative for polydipsia, polyphagia and polyuria.   Hematologic/Lymphatic: Negative for bleeding problem. Does not bruise/bleed easily.   Skin: Negative for dry skin, itching and rash.   Musculoskeletal: Negative for falls.  Positive for right shoulder pain and muscle weakness.   Gastrointestinal: Negative for abdominal pain and bowel incontinence.   Genitourinary: Negative for bladder  "incontinence and nocturia.   Neurological: Negative for disturbances in coordination, loss of balance and seizures.   Psychiatric/Behavioral: Negative for depression. The patient does not have insomnia.    Allergic/Immunologic: Negative for hives and persistent infections.      PHYSICAL EXAMINATION:  Vitals:  /71   Pulse 69   Ht 5' 1" (1.549 m)   Wt 65.8 kg (145 lb)   BMI 27.40 kg/m²    General: The patient is alert and oriented x 3.  Mood is pleasant.  Observation of ears, eyes and nose reveal no gross abnormalities.  No labored breathing observed.  Gait is coordinated. Patient can toe walk and heel walk without difficulty.      RIGHT SHOULDER / UPPER EXTREMITY EXAM    OBSERVATION:     Swelling  none  Deformity  none   Discoloration  none   Scapular winging none   Scars   none  Atrophy  none    TENDERNESS / CREPITUS (T/C):          T/C      T/C   Clavicle   -/-  SUPRAspinatus    -/-     AC Jt.    -/-  INFRAspinatus  -/-    SC Jt.    -/-  Deltoid    -/-      G. Tuberosity  -/-  LH BICEP groove  -/-   Acromion:  -/-  Midline Neck   -/-     Scapular Spine -/-  Trapezium   -/-   SMA Scapula  -/-  GH jt. line - post  -/-     Scapulothoracic  -/-         ROM: (* = with pain)  Left shoulder   Right shoulder        AROM (PROM)   AROM (PROM)   FE    170° (175°)     110°* (160°*)     ER at 0°    60°  (65°)    30°*  (45°*)     IR (spine level)   T10     Sacrum*    STRENGTH: (* = with pain) Left shoulder   Right shoulder   SCAPTION   5/5    5-/5    IR    5/5    4+/5*   ER    5/5    4+/5*   BICEPS   5/5    5/5*   Deltoid    5/5    5/5     SIGNS:  Painful side       NEER   +    OYEES  neg    BLACK   +   SPEEDS  neg     DROP ARM   -   BELLY PRESS neg   Superior escape none    LIFT-OFF  neg   X-Body ADD    neg    MOVING VALGUS neg        STABILITY TESTING    Left shoulder   Right shoulder    Translation     Anterior  up face     up face    Posterior  up face    up face    Sulcus   < 10mm    < 10 " mm     Signs   Apprehension   neg      neg       Relocation   no change     no change      Jerk test  neg     neg    EXTREMITY NEURO-VASCULAR EXAM:    Sensation grossly intact to light touch all dermatomal regions.    DTR 2+ Biceps, Triceps, BR and Negative Janias sign   Grossly intact motor function at Elbow, Wrist and Hand   Distal pulses radial and ulnar 2+, brisk cap refill, symmetric.      NECK:  Painless FROM and spinous processes non-tender. Negative Spurlings sign.      OTHER FINDINGS:  + scapular dyskinesia    XRAYS:  Xrays including AP, Outlet and Axillary Lateral of shoulder are ordered / images reviewed by me:   No fracture dislocation or other pathology   Acromion type 2   Proximal migration of humeral head: None   GH arthritis: None          ASSESSMENT:   Right shoulder pain:  1. Right shoulder impingement syndrome   2.  Right shoulder possible RTC tear      PLAN:      1. Reviewed prior xrays with patient in detail.     2.  MRI of right shoulder ordered today.    3.  Informed patient that if she is still seeing progress with physical therapy she may continue, but if she notices a drop-off and progress or notices her pain to worsen with therapy she should stop at that time.    4.  Follow-up with MRI results.      All questions were answered, patient will contact us for questions or concerns in the interim.      Medical Dictation software was used during the dictation of portions or the entirety of this medical record.  Phonetic or grammatic errors may exist due to the use of this software. For clarification, refer to the author of the document.

## 2024-01-24 NOTE — ED NOTES
I have reviewed discharge instructions with the patient.  The patient verbalized understanding.    Patient left ED via Discharge Method: wheelchair to Home with family    Opportunity for questions and clarification provided.       Patient given 1 scripts.         To continue your aftercare when you leave the hospital, you may receive an automated call from our care team to check in on how you are doing.  This is a free service and part of our promise to provide the best care and service to meet your aftercare needs.” If you have questions, or wish to unsubscribe from this service please call [phone].  Thank you for Choosing our [facility] Emergency Department.

## 2024-01-24 NOTE — ED TRIAGE NOTES
Patient fell of a ladder 6-7 ft up and landed on his right shoulder. He thinks it is broken. No ROM

## 2024-01-25 ENCOUNTER — ANESTHESIA EVENT (OUTPATIENT)
Dept: SURGERY | Age: 64
End: 2024-01-25
Payer: COMMERCIAL

## 2024-01-25 ENCOUNTER — OFFICE VISIT (OUTPATIENT)
Dept: ORTHOPEDIC SURGERY | Age: 64
End: 2024-01-25
Payer: COMMERCIAL

## 2024-01-25 DIAGNOSIS — S42.302A CLOSED FRACTURE OF SHAFT OF LEFT HUMERUS, UNSPECIFIED FRACTURE MORPHOLOGY, INITIAL ENCOUNTER: Primary | ICD-10-CM

## 2024-01-25 PROBLEM — S42.352A CLOSED DISPLACED COMMINUTED FRACTURE OF SHAFT OF LEFT HUMERUS: Status: ACTIVE | Noted: 2024-01-25

## 2024-01-25 PROCEDURE — 99205 OFFICE O/P NEW HI 60 MIN: CPT | Performed by: ORTHOPAEDIC SURGERY

## 2024-01-25 RX ORDER — MEDROXYPROGESTERONE ACETATE 150 MG/ML
INJECTION, SUSPENSION INTRAMUSCULAR
COMMUNITY
Start: 2023-11-13

## 2024-01-25 RX ORDER — IBUPROFEN 200 MG
TABLET ORAL EVERY 6 HOURS PRN
COMMUNITY

## 2024-01-25 RX ORDER — ADALIMUMAB 40MG/0.4ML
KIT SUBCUTANEOUS
COMMUNITY
Start: 2023-12-08

## 2024-01-25 RX ORDER — LORATADINE 10 MG/1
10 TABLET ORAL DAILY PRN
COMMUNITY

## 2024-01-25 RX ORDER — METHOTREXATE 2.5 MG/1
TABLET ORAL WEEKLY
COMMUNITY

## 2024-01-25 RX ORDER — SULFASALAZINE 500 MG/1
1000 TABLET ORAL 2 TIMES DAILY
COMMUNITY
Start: 2019-10-15

## 2024-01-25 RX ORDER — KETOCONAZOLE 20 MG/ML
SHAMPOO TOPICAL DAILY PRN
COMMUNITY
Start: 2019-03-22

## 2024-01-25 RX ORDER — FUROSEMIDE 20 MG/1
TABLET ORAL
COMMUNITY
End: 2024-01-25

## 2024-01-25 RX ORDER — CLOBETASOL PROPIONATE 0.46 MG/ML
SOLUTION TOPICAL PRN
COMMUNITY
Start: 2020-06-10

## 2024-01-25 NOTE — H&P (VIEW-ONLY)
Orthopaedic Trauma Clinic Note    Name: Mendel Ray McGill  YOB: 1960  Gender: male  MRN: [id number]    CC: Patient referred from emergency department for evaluation of upper extremity pain    HPI: Mendel Ray McGill is a 63 y.o. male with a chief complaint of left shoulder pain.  He reports fall approximately 6 feet from a ladder yesterday, followed by acute onset left shoulder pain.  Reports occasional intermittent numbness/tingling in the left hand/fingers.    Patient was seen in the emergency department where radiographs demonstrated left displaced 2 part proximal humerus fracture.  He was placed in sling for comfort and referred for further evaluation.    Accompanied by his wife Margoth eubanks.  Denies prior history of left shoulder pain.  Does report history of rheumatoid arthritis but this primarily affects his hands and feet.    Pain has been controlled with the use of oxycodone/acetaminophen prescribed by ED provider.  He is also taking Zofran for nausea control.    ROS/Meds/PSH/PMH/FH/SH: I personally reviewed the patient's prior notes in electronic medical record.  Pertinent  positives are discussed in the HPI    Musculoskeletal Exam:  Focal physical exam of the left upper extremity demonstrates arm protected in sling for support.  Axillary nerve dermatome appears intact. He is distally neurovascularly tact with 2+ radial pulse    Imaging / Electrodiagnostic Tests:   I independently reviewed three-view radiographs of the left shoulder dated 1/24/2024 obtained in emergency department.  AP, Grashey, scapular Y views demonstrate 2 part completely displaced left proximal humerus fracture with complete translation of the intact humeral shaft anterior to the humerus.  Clavicle appears to be intact with no acute diastases to the AC joint.  Does appear to have slight AC joint arthrosis or evidence of prior injury.  No evidence of scapular injury.    Assessment:     ICD-10-CM    1. Closed fracture of

## 2024-01-25 NOTE — PERIOP NOTE
Patient verified name and .  Order for consent NOT found in EHR at time of PAT visit. Unable to verify case posting against order; surgery verified by patient.    Type 2 surgery, phone assessment complete.  Orders not received.  Labs per surgeon: none ordered  Labs per anesthesia protocol: Hgb, T&S s/h for DOS  EKG:  not indicated    Patient answered medical/surgical history questions at their best of ability. All prior to admission medications documented in EPIC.    Patient instructed to take the following medications the day of surgery according to anesthesia guidelines with a small sip of water: oxycodone-acetaminophen if needed- patient states that he is taking it several times a day, zofran if needed, sulfasalazine.    Hold all vitamins, supplements  and NSAIDS now for surgery. Prescription meds to hold:  none    Patient instructed on the following:    > Arrive at main Entrance, time of arrival to be called the day before by 1700  > NPO after midnight, unless otherwise indicated, including gum, mints, and ice chips  > Responsible adult must drive patient to the hospital, stay during surgery, and patient will need supervision 24 hours after anesthesia  > Use non moisturizing soap in shower the night before surgery and on the morning of surgery  > All piercings must be removed prior to arrival.    > Leave all valuables (money and jewelry) at home but bring insurance card and ID on DOS.   > You may be required to pay a deductible or co-pay on the day of your procedure. You can pre-pay by calling [phone] if your surgery is at the [facility] or [phone] if your surgery is at the [facility].  > Do not wear make-up, nail polish, lotions, cologne, perfumes, powders, or oil on skin. Artificial nails are not permitted.

## 2024-01-25 NOTE — PROGRESS NOTES
Orthopaedic Trauma Clinic Note    Name: Mendel Ray McGill  YOB: 1960  Gender: male  MRN: [id number]    CC: Patient referred from emergency department for evaluation of upper extremity pain    HPI: Mendel Ray McGill is a 63 y.o. male with a chief complaint of left shoulder pain.  He reports fall approximately 6 feet from a ladder yesterday, followed by acute onset left shoulder pain.  Reports occasional intermittent numbness/tingling in the left hand/fingers.    Patient was seen in the emergency department where radiographs demonstrated left displaced 2 part proximal humerus fracture.  He was placed in sling for comfort and referred for further evaluation.    Accompanied by his wife Margoth eubanks.  Denies prior history of left shoulder pain.  Does report history of rheumatoid arthritis but this primarily affects his hands and feet.    Pain has been controlled with the use of oxycodone/acetaminophen prescribed by ED provider.  He is also taking Zofran for nausea control.    ROS/Meds/PSH/PMH/FH/SH: I personally reviewed the patient's prior notes in electronic medical record.  Pertinent  positives are discussed in the HPI    Musculoskeletal Exam:  Focal physical exam of the left upper extremity demonstrates arm protected in sling for support.  Axillary nerve dermatome appears intact. He is distally neurovascularly tact with 2+ radial pulse    Imaging / Electrodiagnostic Tests:   I independently reviewed three-view radiographs of the left shoulder dated 1/24/2024 obtained in emergency department.  AP, Grashey, scapular Y views demonstrate 2 part completely displaced left proximal humerus fracture with complete translation of the intact humeral shaft anterior to the humerus.  Clavicle appears to be intact with no acute diastases to the AC joint.  Does appear to have slight AC joint arthrosis or evidence of prior injury.  No evidence of scapular injury.    Assessment:     ICD-10-CM    1. Closed fracture of

## 2024-01-26 ENCOUNTER — ANESTHESIA (OUTPATIENT)
Dept: SURGERY | Age: 64
End: 2024-01-26
Payer: COMMERCIAL

## 2024-01-26 ENCOUNTER — HOSPITAL ENCOUNTER (OUTPATIENT)
Age: 64
Setting detail: OUTPATIENT SURGERY
Discharge: HOME | End: 2024-01-26
Attending: ORTHOPAEDIC SURGERY | Admitting: ORTHOPAEDIC SURGERY
Payer: COMMERCIAL

## 2024-01-26 ENCOUNTER — APPOINTMENT (OUTPATIENT)
Dept: GENERAL RADIOLOGY | Age: 64
End: 2024-01-26
Attending: ORTHOPAEDIC SURGERY
Payer: COMMERCIAL

## 2024-01-26 VITALS
OXYGEN SATURATION: 95 % | WEIGHT: 185 LBS | RESPIRATION RATE: 16 BRPM | SYSTOLIC BLOOD PRESSURE: 134 MMHG | BODY MASS INDEX: 28.04 KG/M2 | TEMPERATURE: 98 F | HEIGHT: 68 IN | DIASTOLIC BLOOD PRESSURE: 77 MMHG | HEART RATE: 87 BPM

## 2024-01-26 DIAGNOSIS — S42.202A CLOSED FRACTURE OF PROXIMAL END OF LEFT HUMERUS, UNSPECIFIED FRACTURE MORPHOLOGY, INITIAL ENCOUNTER: ICD-10-CM

## 2024-01-26 LAB
ABO + RH BLD: NORMAL
BLOOD GROUP ANTIBODIES SERPL: NORMAL
HGB BLD-MCNC: 11.5 G/DL (ref 13.6–17.2)
SPECIMEN EXP DATE BLD: NORMAL

## 2024-01-26 PROCEDURE — 3700000000 HC ANESTHESIA ATTENDED CARE: Performed by: ORTHOPAEDIC SURGERY

## 2024-01-26 PROCEDURE — 3600000004 HC SURGERY LEVEL 4 BASE: Performed by: ORTHOPAEDIC SURGERY

## 2024-01-26 PROCEDURE — 85018 HEMOGLOBIN: CPT

## 2024-01-26 PROCEDURE — 86850 RBC ANTIBODY SCREEN: CPT

## 2024-01-26 PROCEDURE — 6360000002 HC RX W HCPCS: Performed by: ORTHOPAEDIC SURGERY

## 2024-01-26 PROCEDURE — 6360000002 HC RX W HCPCS

## 2024-01-26 PROCEDURE — 2500000003 HC RX 250 WO HCPCS: Performed by: ANESTHESIOLOGY

## 2024-01-26 PROCEDURE — 7100000001 HC PACU RECOVERY - ADDTL 15 MIN: Performed by: ORTHOPAEDIC SURGERY

## 2024-01-26 PROCEDURE — 2500000003 HC RX 250 WO HCPCS

## 2024-01-26 PROCEDURE — 86901 BLOOD TYPING SEROLOGIC RH(D): CPT

## 2024-01-26 PROCEDURE — 2580000003 HC RX 258: Performed by: ANESTHESIOLOGY

## 2024-01-26 PROCEDURE — 3700000001 HC ADD 15 MINUTES (ANESTHESIA): Performed by: ORTHOPAEDIC SURGERY

## 2024-01-26 PROCEDURE — 6360000002 HC RX W HCPCS: Performed by: ANESTHESIOLOGY

## 2024-01-26 PROCEDURE — 7100000011 HC PHASE II RECOVERY - ADDTL 15 MIN: Performed by: ORTHOPAEDIC SURGERY

## 2024-01-26 PROCEDURE — 23615 OPTX PROX HUMRL FX W/INT FIX: CPT | Performed by: ORTHOPAEDIC SURGERY

## 2024-01-26 PROCEDURE — 7100000010 HC PHASE II RECOVERY - FIRST 15 MIN: Performed by: ORTHOPAEDIC SURGERY

## 2024-01-26 PROCEDURE — 2709999900 HC NON-CHARGEABLE SUPPLY: Performed by: ORTHOPAEDIC SURGERY

## 2024-01-26 PROCEDURE — 6370000000 HC RX 637 (ALT 250 FOR IP): Performed by: ANESTHESIOLOGY

## 2024-01-26 PROCEDURE — 7100000000 HC PACU RECOVERY - FIRST 15 MIN: Performed by: ORTHOPAEDIC SURGERY

## 2024-01-26 PROCEDURE — 2720000010 HC SURG SUPPLY STERILE: Performed by: ORTHOPAEDIC SURGERY

## 2024-01-26 PROCEDURE — 86900 BLOOD TYPING SEROLOGIC ABO: CPT

## 2024-01-26 PROCEDURE — 64415 NJX AA&/STRD BRCH PLXS IMG: CPT | Performed by: ANESTHESIOLOGY

## 2024-01-26 PROCEDURE — C1713 ANCHOR/SCREW BN/BN,TIS/BN: HCPCS | Performed by: ORTHOPAEDIC SURGERY

## 2024-01-26 PROCEDURE — 3600000014 HC SURGERY LEVEL 4 ADDTL 15MIN: Performed by: ORTHOPAEDIC SURGERY

## 2024-01-26 DEVICE — IMPLANTABLE DEVICE: Type: IMPLANTABLE DEVICE | Site: HUMERUS | Status: FUNCTIONAL

## 2024-01-26 DEVICE — SCREW BNE L26MM DIA4MM TIB BLU TI ST CANN LOK FULL THRD T25: Type: IMPLANTABLE DEVICE | Site: HUMERUS | Status: FUNCTIONAL

## 2024-01-26 DEVICE — SCREW BNE L32MM DIA4MM TIB BLU TI ST CANN LOK FULL THRD T25: Type: IMPLANTABLE DEVICE | Site: HUMERUS | Status: FUNCTIONAL

## 2024-01-26 RX ORDER — DIPHENHYDRAMINE HYDROCHLORIDE 50 MG/ML
12.5 INJECTION INTRAMUSCULAR; INTRAVENOUS
Status: DISCONTINUED | OUTPATIENT
Start: 2024-01-26 | End: 2024-01-26 | Stop reason: HOSPADM

## 2024-01-26 RX ORDER — SODIUM CHLORIDE 0.9 % (FLUSH) 0.9 %
5-40 SYRINGE (ML) INJECTION EVERY 12 HOURS SCHEDULED
Status: DISCONTINUED | OUTPATIENT
Start: 2024-01-26 | End: 2024-01-26 | Stop reason: HOSPADM

## 2024-01-26 RX ORDER — HYDROMORPHONE HYDROCHLORIDE 2 MG/ML
0.5 INJECTION, SOLUTION INTRAMUSCULAR; INTRAVENOUS; SUBCUTANEOUS EVERY 10 MIN PRN
Status: DISCONTINUED | OUTPATIENT
Start: 2024-01-26 | End: 2024-01-26 | Stop reason: HOSPADM

## 2024-01-26 RX ORDER — NALOXONE HYDROCHLORIDE 4 MG/.1ML
1 SPRAY NASAL PRN
Qty: 1 EACH | Refills: 0 | Status: SHIPPED | OUTPATIENT
Start: 2024-01-26

## 2024-01-26 RX ORDER — DEXAMETHASONE SODIUM PHOSPHATE 4 MG/ML
INJECTION, SOLUTION INTRA-ARTICULAR; INTRALESIONAL; INTRAMUSCULAR; INTRAVENOUS; SOFT TISSUE PRN
Status: DISCONTINUED | OUTPATIENT
Start: 2024-01-26 | End: 2024-01-26 | Stop reason: SDUPTHER

## 2024-01-26 RX ORDER — ONDANSETRON 2 MG/ML
INJECTION INTRAMUSCULAR; INTRAVENOUS PRN
Status: DISCONTINUED | OUTPATIENT
Start: 2024-01-26 | End: 2024-01-26 | Stop reason: SDUPTHER

## 2024-01-26 RX ORDER — OXYCODONE HYDROCHLORIDE AND ACETAMINOPHEN 5; 325 MG/1; MG/1
1-2 TABLET ORAL EVERY 4 HOURS PRN
Qty: 20 TABLET | Refills: 0 | Status: SHIPPED | OUTPATIENT
Start: 2024-01-26 | End: 2024-01-31

## 2024-01-26 RX ORDER — VANCOMYCIN HYDROCHLORIDE 1 G/20ML
INJECTION, POWDER, LYOPHILIZED, FOR SOLUTION INTRAVENOUS PRN
Status: DISCONTINUED | OUTPATIENT
Start: 2024-01-26 | End: 2024-01-26 | Stop reason: ALTCHOICE

## 2024-01-26 RX ORDER — OXYCODONE HYDROCHLORIDE 5 MG/1
5 TABLET ORAL
Status: COMPLETED | OUTPATIENT
Start: 2024-01-26 | End: 2024-01-26

## 2024-01-26 RX ORDER — ESMOLOL HYDROCHLORIDE 10 MG/ML
INJECTION, SOLUTION INTRAVENOUS PRN
Status: DISCONTINUED | OUTPATIENT
Start: 2024-01-26 | End: 2024-01-26 | Stop reason: SDUPTHER

## 2024-01-26 RX ORDER — GLYCOPYRROLATE 0.2 MG/ML
INJECTION INTRAMUSCULAR; INTRAVENOUS PRN
Status: DISCONTINUED | OUTPATIENT
Start: 2024-01-26 | End: 2024-01-26 | Stop reason: SDUPTHER

## 2024-01-26 RX ORDER — ROCURONIUM BROMIDE 10 MG/ML
INJECTION, SOLUTION INTRAVENOUS PRN
Status: DISCONTINUED | OUTPATIENT
Start: 2024-01-26 | End: 2024-01-26 | Stop reason: SDUPTHER

## 2024-01-26 RX ORDER — PROPOFOL 10 MG/ML
INJECTION, EMULSION INTRAVENOUS PRN
Status: DISCONTINUED | OUTPATIENT
Start: 2024-01-26 | End: 2024-01-26 | Stop reason: SDUPTHER

## 2024-01-26 RX ORDER — METOCLOPRAMIDE HYDROCHLORIDE 5 MG/ML
10 INJECTION INTRAMUSCULAR; INTRAVENOUS
Status: DISCONTINUED | OUTPATIENT
Start: 2024-01-26 | End: 2024-01-26 | Stop reason: HOSPADM

## 2024-01-26 RX ORDER — MIDAZOLAM HYDROCHLORIDE 2 MG/2ML
2 INJECTION, SOLUTION INTRAMUSCULAR; INTRAVENOUS
Status: COMPLETED | OUTPATIENT
Start: 2024-01-26 | End: 2024-01-26

## 2024-01-26 RX ORDER — ONDANSETRON 2 MG/ML
4 INJECTION INTRAMUSCULAR; INTRAVENOUS
Status: DISCONTINUED | OUTPATIENT
Start: 2024-01-26 | End: 2024-01-26 | Stop reason: HOSPADM

## 2024-01-26 RX ORDER — FENTANYL CITRATE 50 UG/ML
100 INJECTION, SOLUTION INTRAMUSCULAR; INTRAVENOUS
Status: DISCONTINUED | OUTPATIENT
Start: 2024-01-26 | End: 2024-01-26 | Stop reason: HOSPADM

## 2024-01-26 RX ORDER — LIDOCAINE HYDROCHLORIDE 20 MG/ML
INJECTION, SOLUTION EPIDURAL; INFILTRATION; INTRACAUDAL; PERINEURAL PRN
Status: DISCONTINUED | OUTPATIENT
Start: 2024-01-26 | End: 2024-01-26 | Stop reason: SDUPTHER

## 2024-01-26 RX ORDER — SODIUM CHLORIDE, SODIUM LACTATE, POTASSIUM CHLORIDE, CALCIUM CHLORIDE 600; 310; 30; 20 MG/100ML; MG/100ML; MG/100ML; MG/100ML
INJECTION, SOLUTION INTRAVENOUS CONTINUOUS
Status: DISCONTINUED | OUTPATIENT
Start: 2024-01-26 | End: 2024-01-26 | Stop reason: HOSPADM

## 2024-01-26 RX ORDER — FENTANYL CITRATE 50 UG/ML
25 INJECTION, SOLUTION INTRAMUSCULAR; INTRAVENOUS EVERY 5 MIN PRN
Status: DISCONTINUED | OUTPATIENT
Start: 2024-01-26 | End: 2024-01-26 | Stop reason: HOSPADM

## 2024-01-26 RX ORDER — SCOLOPAMINE TRANSDERMAL SYSTEM 1 MG/1
1 PATCH, EXTENDED RELEASE TRANSDERMAL
Status: DISCONTINUED | OUTPATIENT
Start: 2024-01-26 | End: 2024-01-26 | Stop reason: HOSPADM

## 2024-01-26 RX ORDER — EPHEDRINE SULFATE/0.9% NACL/PF 50 MG/5 ML
SYRINGE (ML) INTRAVENOUS PRN
Status: DISCONTINUED | OUTPATIENT
Start: 2024-01-26 | End: 2024-01-26 | Stop reason: SDUPTHER

## 2024-01-26 RX ORDER — ONDANSETRON 4 MG/1
4 TABLET, FILM COATED ORAL EVERY 8 HOURS PRN
Qty: 30 TABLET | Refills: 0 | Status: SHIPPED | OUTPATIENT
Start: 2024-01-26

## 2024-01-26 RX ORDER — BUPIVACAINE HYDROCHLORIDE AND EPINEPHRINE 5; 5 MG/ML; UG/ML
INJECTION, SOLUTION EPIDURAL; INTRACAUDAL; PERINEURAL
Status: COMPLETED | OUTPATIENT
Start: 2024-01-26 | End: 2024-01-26

## 2024-01-26 RX ORDER — NEOSTIGMINE METHYLSULFATE 1 MG/ML
INJECTION, SOLUTION INTRAVENOUS PRN
Status: DISCONTINUED | OUTPATIENT
Start: 2024-01-26 | End: 2024-01-26 | Stop reason: SDUPTHER

## 2024-01-26 RX ADMIN — FENTANYL CITRATE 100 MCG: 50 INJECTION INTRAMUSCULAR; INTRAVENOUS at 08:11

## 2024-01-26 RX ADMIN — LIDOCAINE HYDROCHLORIDE 20 MG: 20 INJECTION, SOLUTION EPIDURAL; INFILTRATION; INTRACAUDAL; PERINEURAL at 11:06

## 2024-01-26 RX ADMIN — ROCURONIUM BROMIDE 10 MG: 50 INJECTION, SOLUTION INTRAVENOUS at 10:25

## 2024-01-26 RX ADMIN — OXYCODONE 5 MG: 5 TABLET ORAL at 12:22

## 2024-01-26 RX ADMIN — DEXAMETHASONE SODIUM PHOSPHATE 4 MG: 4 INJECTION, SOLUTION INTRAMUSCULAR; INTRAVENOUS at 08:27

## 2024-01-26 RX ADMIN — ROCURONIUM BROMIDE 10 MG: 50 INJECTION, SOLUTION INTRAVENOUS at 10:32

## 2024-01-26 RX ADMIN — SODIUM CHLORIDE, SODIUM LACTATE, POTASSIUM CHLORIDE, AND CALCIUM CHLORIDE: 600; 310; 30; 20 INJECTION, SOLUTION INTRAVENOUS at 07:28

## 2024-01-26 RX ADMIN — PHENYLEPHRINE HYDROCHLORIDE 100 MCG: 0.1 INJECTION, SOLUTION INTRAVENOUS at 08:25

## 2024-01-26 RX ADMIN — Medication 10 MG: at 09:09

## 2024-01-26 RX ADMIN — Medication 2000 MG: at 08:26

## 2024-01-26 RX ADMIN — Medication 10 MG: at 09:20

## 2024-01-26 RX ADMIN — PHENYLEPHRINE HYDROCHLORIDE 100 MCG: 0.1 INJECTION, SOLUTION INTRAVENOUS at 08:55

## 2024-01-26 RX ADMIN — Medication 5 MG: at 11:00

## 2024-01-26 RX ADMIN — Medication 10 MG: at 08:32

## 2024-01-26 RX ADMIN — Medication 10 MG: at 10:12

## 2024-01-26 RX ADMIN — PROPOFOL 150 MG: 10 INJECTION, EMULSION INTRAVENOUS at 08:12

## 2024-01-26 RX ADMIN — SODIUM CHLORIDE, SODIUM LACTATE, POTASSIUM CHLORIDE, AND CALCIUM CHLORIDE: 600; 310; 30; 20 INJECTION, SOLUTION INTRAVENOUS at 09:00

## 2024-01-26 RX ADMIN — BUPIVACAINE HYDROCHLORIDE AND EPINEPHRINE BITARTRATE 30 ML: 5; .005 INJECTION, SOLUTION EPIDURAL; INTRACAUDAL; PERINEURAL at 07:49

## 2024-01-26 RX ADMIN — ESMOLOL HYDROCHLORIDE 20 MG: 10 INJECTION INTRAVENOUS at 11:11

## 2024-01-26 RX ADMIN — ROCURONIUM BROMIDE 10 MG: 50 INJECTION, SOLUTION INTRAVENOUS at 09:04

## 2024-01-26 RX ADMIN — PHENYLEPHRINE HYDROCHLORIDE 200 MCG: 0.1 INJECTION, SOLUTION INTRAVENOUS at 08:29

## 2024-01-26 RX ADMIN — ONDANSETRON 4 MG: 2 INJECTION INTRAMUSCULAR; INTRAVENOUS at 10:43

## 2024-01-26 RX ADMIN — LIDOCAINE HYDROCHLORIDE 60 MG: 20 INJECTION, SOLUTION EPIDURAL; INFILTRATION; INTRACAUDAL; PERINEURAL at 08:11

## 2024-01-26 RX ADMIN — PHENYLEPHRINE HYDROCHLORIDE 100 MCG: 0.1 INJECTION, SOLUTION INTRAVENOUS at 09:00

## 2024-01-26 RX ADMIN — PROPOFOL 20 MG: 10 INJECTION, EMULSION INTRAVENOUS at 11:04

## 2024-01-26 RX ADMIN — GLYCOPYRROLATE 0.6 MG: 0.2 INJECTION INTRAMUSCULAR; INTRAVENOUS at 11:00

## 2024-01-26 RX ADMIN — SODIUM CHLORIDE, SODIUM LACTATE, POTASSIUM CHLORIDE, AND CALCIUM CHLORIDE: 600; 310; 30; 20 INJECTION, SOLUTION INTRAVENOUS at 10:33

## 2024-01-26 RX ADMIN — MIDAZOLAM 2 MG: 1 INJECTION INTRAMUSCULAR; INTRAVENOUS at 07:46

## 2024-01-26 RX ADMIN — PHENYLEPHRINE HYDROCHLORIDE 100 MCG: 0.1 INJECTION, SOLUTION INTRAVENOUS at 08:31

## 2024-01-26 RX ADMIN — ROCURONIUM BROMIDE 40 MG: 50 INJECTION, SOLUTION INTRAVENOUS at 08:12

## 2024-01-26 RX ADMIN — Medication 10 MG: at 10:47

## 2024-01-26 RX ADMIN — PHENYLEPHRINE HYDROCHLORIDE 100 MCG: 0.1 INJECTION, SOLUTION INTRAVENOUS at 08:27

## 2024-01-26 ASSESSMENT — PAIN - FUNCTIONAL ASSESSMENT
PAIN_FUNCTIONAL_ASSESSMENT: NONE - DENIES PAIN
PAIN_FUNCTIONAL_ASSESSMENT: NONE - DENIES PAIN
PAIN_FUNCTIONAL_ASSESSMENT: 0-10
PAIN_FUNCTIONAL_ASSESSMENT: 0-10
PAIN_FUNCTIONAL_ASSESSMENT: NONE - DENIES PAIN

## 2024-01-26 ASSESSMENT — PAIN DESCRIPTION - ORIENTATION: ORIENTATION: LEFT

## 2024-01-26 ASSESSMENT — PAIN DESCRIPTION - LOCATION: LOCATION: SHOULDER

## 2024-01-26 ASSESSMENT — PAIN SCALES - GENERAL: PAINLEVEL_OUTOF10: 4

## 2024-01-26 NOTE — ANESTHESIA PROCEDURE NOTES
Peripheral Block    Patient location during procedure: pre-op  Reason for block: post-op pain management and at surgeon's request  Start time: 1/26/2024 7:44 AM  End time: 1/26/2024 7:49 AM  Staffing  Anesthesiologist: Nasir Love MD  Performed by: Nasir Love MD  Authorized by: Nasir Love MD    Preanesthetic Checklist  Completed: patient identified, IV checked, site marked, risks and benefits discussed, surgical/procedural consents, equipment checked, pre-op evaluation, timeout performed, anesthesia consent given, oxygen available and monitors applied/VS acknowledged  Peripheral Block   Patient position: prone  Prep: ChloraPrep  Provider prep: mask and sterile gloves  Patient monitoring: cardiac monitor, continuous pulse ox, frequent blood pressure checks, IV access, oxygen and responsive to questions  Block type: Brachial plexus  Interscalene  Laterality: left  Injection technique: single-shot  Guidance: ultrasound guided    Needle   Needle type: insulated echogenic nerve stimulator needle   Needle gauge: 22 G  Needle localization: anatomical landmarks and ultrasound guidance  Test dose: negative  Needle length: 4.  Assessment   Injection assessment: negative aspiration for heme, no paresthesia on injection and local visualized surrounding nerve on ultrasound  Slow fractionated injection: yes  Hemodynamics: stable  Real-time US image taken/store: yes  Outcomes: uncomplicated    Medications Administered  BUPivacaine 0.5%-EPINEPHrine injection 1:200000 - Perineural   30 mL - 1/26/2024 7:49:00 AM

## 2024-01-26 NOTE — ANESTHESIA POSTPROCEDURE EVALUATION
Department of Anesthesiology  Postprocedure Note    Patient: Mendel Ray McGill  MRN: [id number]  YOB: 1960  Date of evaluation: 1/26/2024    Procedure Summary       Date: 01/26/24 Room / Location: [facility] MAIN OR [address] [place] [address] MAIN OR    Anesthesia Start: 0805 Anesthesia Stop: 1121    Procedure: LEFT HUMERUS IM NAIL JAYNA INSERTION (Left: Shoulder) Diagnosis:       Closed displaced comminuted fracture of shaft of left humerus      (Closed displaced comminuted fracture of shaft of left humerus [S42.352A])    Providers: Jaylen Freire MD Responsible Provider: Jaison Quintero MD    Anesthesia Type: general ASA Status: 2            Anesthesia Type: No value filed.    Martinez Phase I: Martinez Score: 8    Martinez Phase II:      Anesthesia Post Evaluation    Patient location during evaluation: PACU  Patient participation: complete - patient participated  Level of consciousness: awake and alert  Pain score: 1  Airway patency: patent  Nausea & Vomiting: no nausea  Cardiovascular status: blood pressure returned to baseline and hemodynamically stable  Respiratory status: acceptable  Hydration status: euvolemic  Multimodal analgesia pain management approach  Pain management: adequate and satisfactory to patient    No notable events documented.

## 2024-01-26 NOTE — DISCHARGE INSTRUCTIONS
Orthopedic Post-op Discharge Instructions     Your surgery: Left humerus intramedullary nail for 2 part proximal humerus fracture      Check these websites for more information regarding your injury/condition:    - [employer] Patient Education Portal: https://orthoinfo.aaos.org/    - [employer] Patient Education Portal: https://yuri.org/for-patients/find-info-body-part        General instructions:    - Skin closed with staples.     - May change dressings as needed. May shower or bathe beginning 4th day after surgery. Wash with soap/water, pat dry. Replace dry dressing: apply thin layer triple-antibiotic ointment to prevent adherence to wound, then cover with gauze and tape.  - Maintain weightbearing left arm < 5 lbs; OK for passive range of motion of Left shoulder.    Post-operative rehabilitation protocol can be surgeon dependent  > Strict use of sling  > Pendulum exercises 3-4 times daily  >Restrictions x 6 weeks  No lifting/pushing/pulling > 5lbs  No external rotation past 40 degrees  No active internal rotation  No cross body adduction    Medications:  - Take Zofran (Odansetron) 1 tablet every 4 hrs as needed for nausea/vomiting  - Take Colace (Docusate sodium) 1 or 2 tablets twice daily as needed for constipation (side effect of pain medication)      Pain control:    - You had a regional anesthetic (nerve block). Begin taking pain medication every 4 hours before bedtime on day of surgery in anticipation of block wearing off. Set alarm to take 1 tablet of pain medication every 4 hours overnight. Anticipate nerve block will wear off between 18 and 24 hours following surgery, though they occasionally last longer.  As block wears off, OK to take additional tablet of pain medication every 45 min to 1 hour as needed until pain is under control (rated < 4 on pain scale, with 10/10 pain being worst pain of your life).        Keys to minimizing use of narcotics:    > Keep

## 2024-01-26 NOTE — OP NOTE
ORTHOPEDIC TRAUMA OPERATIVE REPORT      Mendel Ray McGill      male   1960   [id number]      SURGEON: Jaylen Freire MD, present and scrubbed for entire procedure     PRE-OP DIAGNOSIS: Left proximal humerus fracture         POST-OP DIAGNOSIS: Same     ANESTHESIA: General     PROCEDURE(S) PERFORMED:   Open treatment of proximal humeral (surgical or anatomical neck) fracture (CPT 58681)    KEY FINDINGS:   - Successful closed reduction    IMPLANTS: Synthes multiloc intramedullary nail w/ 3 x proximal, calcar, single distal interlock screws      INDICATIONS:    Mendel Ray McGill is a 63 y.o. male who sustained fall approximately 6 feet from a ladder 1/24 resulting in Left proximal humerus fracture. Surgical and non-surgical treatment options were discussed with the patient and their family, as well as the risk and benefits of each option. Together, we decided to proceed with surgical management of their fracture. We discussed surgical risks in detail to include intra-operative complications (anesthetic risks, neurovascular injury, blood loss) as well as post-operative complications (persistent pain, infection, weakness, stiffness, rotator cuff injury, nonunion, malunion, hardware loosening or failure) and/or need for further surgery. Following discussion of the risks and benefits, the patient and family consented to proceed.      PROCEDURE IN DETAIL:    The patient was identified in the holding room. Left shoulder was marked and confirmed as the correct surgical site. Patient transferred to OR and positioned on OR table taking care to pad and offload all bony prominences. Operative extremity was pre-scrubbed and then prepped and draped in routine sterile fashion. Anesthesia was induced. Antibiotics administered per protocol. A timeout was performed, verifying the correct patient, laterality, procedure, and equipment.     Fluoroscopy utilized to farhan out shoulder landmarks to include the coracoid process, anterior border  of clavicle, AC joint, acromion, and deltoid tuberosity. Beginning at the anterolateral aspect of the acromion, a 3 cm incision was carried anterolaterally.  Sharp dissection through skin and subcutaneous tissue. Elevator used to clear deltoid fascia. Deltoid split along anterior raphe taking care to protect axillary nerve which was not visualized. #2 ethibond traction sutures placed into supraspinatus which was carefully incised taking care to avoid the footprint of the tendon. By translating the humeral head laterally utilizing lap sponge around upper brachium, I was able to obtain adequate exposure at the central aspect of the humeral head for my start site. Guidewire placed at appropriate center-center position on both AP and lateral views utilizing fluoroscopy. Guidewire then advanced through humeral head to humeral neck. Entry reamer then cannulated over the guidewire taking care to protect the overlying rotator cuff. Proximal humerus reamed to access canal. Initial guidewire then exchanged for a long, bent-tip guidewire. Using biplanar fluoroscopy, this was carefully advanced down the shaft of the humerus utilizing closed manipulation to achieve provisional reduction. Guidewire was then adjusted to achieve distal nail fixation in the cortical bone proximal to the olecranon fossa. A 225 x 7 mm Synthes MultiLoc humeral nail was then carefully advanced over the guidewire. Proximal fixation was achieved using jig to target 3 conical screws and one standard interlock calcar screw into proximal segment. Rotation was secondarily assessed utilizing comparison of alignment of the anterior to posterior interlock screw hole in the proximal segment relative to the oblique screw holes in the distal segment. Following verification of adequate length, alignment, and rotation, single distal interlock screw placed utilizing perfect circles technique.     Final biplanar fluoroscopy demonstrated anatomic alignment and excellent

## 2024-01-26 NOTE — INTERVAL H&P NOTE
Update History & Physical    The patient's History and Physical of was reviewed with the patient and I examined the patient. There was no change. The surgical site was confirmed by the patient and me.     Procedure: Left proximal humerus ORIF vs IMN    Plan: The risks, benefits, expected outcome, and alternative to the recommended procedure have been discussed with the patient. Patient understands and wants to proceed with the procedure.

## 2024-01-26 NOTE — ANESTHESIA PRE PROCEDURE
Department of Anesthesiology  Preprocedure Note       Name:  Mendel Ray McGill   Age:  63 y.o.  :  1960                                          MRN:  [id number]         Date:  2024      Surgeon: Surgeon(s):  Jaylen Freire MD    Procedure: Procedure(s):  LEFT HUMERUS IM NAIL JAYNA INSERTION VS. PLATE    Medications prior to admission:   Prior to Admission medications    Medication Sig Start Date End Date Taking? Authorizing Provider   clobetasol (TEMOVATE) 0.05 % external solution Apply topically as needed 6/10/20  Yes Dhaval Fraga MD   ketoconazole (NIZORAL) 2 % shampoo daily as needed 3/22/19  Yes Dhaval Fraga MD   nystatin-triamcinolone (MYCOLOG II) 924703-4.1 UNIT/GM-% cream Apply topically as needed 6/15/21  Yes Dhaval Fraga MD   ENBREL SURECLICK 50 MG/ML SOAJ  23   Dhaval Fraga MD   methotrexate (RHEUMATREX) 2.5 MG chemo tablet once a week Takes on     Dhaval Fraga MD   sulfaSALAzine (AZULFIDINE) 500 MG tablet Take 2 tablets by mouth 2 times daily 10/15/19   Dhaval Fraga MD   adalimumab (HUMIRA PEN) 40 MG/0.4ML PNKT 40 mg Subcutaneous every 2 weeks for 30 days  Patient not taking: Reported on 2024   Dhaval Fraga MD   ibuprofen (ADVIL;MOTRIN) 200 MG tablet every 6 hours as needed    Dhaval Fraga MD   loratadine (CLARITIN) 10 MG tablet Take 1 tablet by mouth daily as needed    Dhaval Fraga MD   folic acid (FOLVITE) 1 MG tablet Take 1 tablet by mouth daily    Dhaval Fraga MD   oxyCODONE-acetaminophen (PERCOCET) 5-325 MG per tablet Take 1 tablet by mouth every 6 hours as needed for Pain for up to 5 days. Intended supply: 3 days. Take lowest dose possible to manage pain Max Daily Amount: 4 tablets 24  Jean Harden PA   ondansetron (ZOFRAN-ODT) 4 MG disintegrating tablet Take 1 tablet by mouth 3 times daily as needed for Nausea or Vomiting  Patient not taking: Reported

## 2024-01-26 NOTE — ANESTHESIA PROCEDURE NOTES
Airway  Date/Time: 1/26/2024 8:15 AM  Urgency: elective    Airway not difficult    General Information and Staff    Patient location during procedure: OR  Resident/CRNA: Ramona Velez APRN - CRNA  Performed: resident/CRNA   Performed by: Ramona Velez APRN - CRNA  Authorized by: Nasir Love MD      Indications and Patient Condition  Indications for airway management: anesthesia  Spontaneous Ventilation: absent  Sedation level: deep  Preoxygenated: yes  Patient position: sniffing  MILS not maintained throughout  Mask difficulty assessment: vent by bag mask    Final Airway Details  Final airway type: endotracheal airway      Successful airway: ETT  Cuffed: yes   Successful intubation technique: direct laryngoscopy  Facilitating devices/methods: intubating stylet  Endotracheal tube insertion site: oral  Blade: Wilkerson  Blade size: #2  ETT size (mm): 7.5  Cormack-Lehane Classification: grade IIa - partial view of glottis  Placement verified by: chest auscultation and capnometry   Cuff volume (mL): 8  Measured from: teeth  ETT to teeth (cm): 23  Number of attempts at approach: 1  Ventilation between attempts: bag mask  Number of other approaches attempted: 0    Additional Comments  Lips and dentition unchanged.  no

## 2024-02-08 ENCOUNTER — TELEPHONE (OUTPATIENT)
Dept: ORTHOPEDIC SURGERY | Age: 64
End: 2024-02-08

## 2024-02-08 ENCOUNTER — OFFICE VISIT (OUTPATIENT)
Dept: ORTHOPEDIC SURGERY | Age: 64
End: 2024-02-08

## 2024-02-08 DIAGNOSIS — S42.302A CLOSED FRACTURE OF SHAFT OF LEFT HUMERUS, UNSPECIFIED FRACTURE MORPHOLOGY, INITIAL ENCOUNTER: Primary | ICD-10-CM

## 2024-02-08 DIAGNOSIS — M25.532 LEFT WRIST PAIN: ICD-10-CM

## 2024-02-08 NOTE — PROGRESS NOTES
Progress Note    Patient: Mendel Ray McGill MRN: [id number]  SSN: [id number]    YOB: 1960  Age: 63 y.o.  Sex: male        2/8/2024      Subjective:     Patient is here today in follow-up for a left proximal humerus fracture.  He is 2 weeks out from intramedullary nail fixation of a left surgical neck 2 part proximal humerus fracture that was treated by Dr. Freire.  He is complaining of a lot of pain and swelling not just in his arm but sort of in his elbow and wrist as well.  He has some bruising and swelling down to his hand    Objective:     There were no vitals filed for this visit.       Physical Exam:     Skin - incision is well healed with no redness or drainage  Motor and sensory function intact in LEFT UPPER extremity  Pulses palpable in LEFT UPPER extremity   He does have significant mount of bruising and swelling in his left elbow and left wrist.  His tenderness to palpation is not as extensive as his swelling is  XRAY FINDINGS:  I did not get follow-up x-rays of his humerus but because of his swelling and appearance of his left upper extremity I did x-ray his elbow and wrist    Indications-left wrist pain after a fall, findings-AP and lateral views of the left wrist show a left distal radius fracture.  This appears to be most extra-articular with a fracture line extending into the radiocarpal joint surface.  The overall alignment appears to be reasonable he also has a very small avulsion off of his ulnar styloid as well.  Impression-well aligned intra-articular left distal radius fracture    Indications-left elbow pain after fall, findings-AP and lateral views of the left elbow shows no evidence of any obvious fractures or dislocations.  These are essentially normal elbow x-rays.  No degenerative changes.  Impression-negative for fracture left elbow    Assessment:     Number 1-2 weeks out from left proximal humerus fracture treated with intramedullary nail fixation, #2-left well aligned

## 2024-02-08 NOTE — TELEPHONE ENCOUNTER
His wife would like a call back regarding the physical therapy. Kristy has to approve him to go to therapy and she has the preapproval line for them. Please call

## 2024-02-13 ENCOUNTER — TELEPHONE (OUTPATIENT)
Dept: ORTHOPEDIC SURGERY | Age: 64
End: 2024-02-13

## 2024-02-13 NOTE — TELEPHONE ENCOUNTER
Spoke to Pt  Re: Pt order  that was faxed to Kristy. We received a letter from [facility] but this was sent to Insurance before PT requested Auth.Pt  was  advised PT will get auth for services he stated he wants to go to [facility]. LH

## 2024-02-15 ENCOUNTER — HOSPITAL ENCOUNTER (OUTPATIENT)
Dept: PHYSICAL THERAPY | Age: 64
Setting detail: RECURRING SERIES
Discharge: HOME | End: 2024-02-18
Attending: ORTHOPAEDIC SURGERY
Payer: COMMERCIAL

## 2024-02-15 DIAGNOSIS — M25.532 PAIN IN LEFT WRIST: ICD-10-CM

## 2024-02-15 DIAGNOSIS — M25.512 ACUTE PAIN OF LEFT SHOULDER: Primary | ICD-10-CM

## 2024-02-15 PROCEDURE — 97162 PT EVAL MOD COMPLEX 30 MIN: CPT

## 2024-02-15 ASSESSMENT — PAIN DESCRIPTION - ORIENTATION: ORIENTATION: LEFT

## 2024-02-15 ASSESSMENT — PAIN SCALES - GENERAL: PAINLEVEL_OUTOF10: 3

## 2024-02-15 ASSESSMENT — PAIN DESCRIPTION - LOCATION: LOCATION: SHOULDER;WRIST

## 2024-02-15 NOTE — THERAPY EVALUATION
Mendel Ray McGill  : 1960  Primary: Christopher Nash (Gisela ALCALA)  Secondary:  [facility] [place] @ [address] [place]  [place] [address]  Phone: [phone]  Fax: [phone] Plan Frequency: 2 times per week  Plan of Care/Certification Expiration Date: 05/15/24        Plan of Care/Certification Expiration Date:  Plan of Care/Certification Expiration Date: 05/15/24    Frequency/Duration: Plan Frequency: 2 times per week      Time In/Out:   Time In: 1345  Time Out: 1430      PT Visit Info:    Progress Note Due Date: 24  Total # of Visits to Date: 1  Progress Note Counter: 1      Visit Count:  1                OUTPATIENT PHYSICAL THERAPY:             Initial Assessment 2/15/2024               Episode (PT: Left humeral fracture)         Treatment Diagnosis:     Acute pain of left shoulder  Pain in left wrist  Medical/Referring Diagnosis:    Closed fracture of shaft of left humerus, unspecified fracture morphology, initial encounter [S42.302A]  Left wrist pain [M25.532]  Referring Physician:  Huy Matson MD MD Orders:  Evaluate & Treat:  -Left Shoulder: Full Active/Passive ROM and Full Strengthening;   -Left Wrist: Full Active/Gentle Passive ROM and NO Strenghtening.   Return MD Appt:  2024  Date of Onset:  Onset Date: 24 (Surgery: 2024)  Allergies:  Shellfish allergy and Penicillins  Restrictions/Precautions:    No restrictions for L shoulder; strengthening restrictions for L wrist      Medications Last Reviewed:  2/15/2024     SUBJECTIVE   History of Injury/Illness (Reason for Referral):  Patient reports he was on an A-frame ladder when he fell about 6 feet to the ground and landed on his left side.  He reports that he isn't really sure how he fell, but when he tried to move his arm he couldn't and so he knew something was wrong. He reports his son took him to the ER.  He reports the x-rays showed the fracture in his arm.  He reports

## 2024-02-15 NOTE — PROGRESS NOTES
Mendel Ray McGill  : 1960  Primary: Christopher Nash (Gisela ALCALA)  Secondary:  [facility] @ [address] [place]  [place] [address]  Phone: [phone]  Fax: [phone] Plan Frequency: 2 times per week  Plan of Care/Certification Expiration Date: 05/15/24        Plan of Care/Certification Expiration Date:  Plan of Care/Certification Expiration Date: 05/15/24    Frequency/Duration: Plan Frequency: 2 times per week      Time In/Out:   Time In: 1345  Time Out: 1430      PT Visit Info:    Progress Note Due Date: 24  Total # of Visits to Date: 1  Progress Note Counter: 1      Visit Count:  1    OUTPATIENT PHYSICAL THERAPY:   Treatment Note 2/15/2024       Episode  (PT: Left humeral fracture)               Treatment Diagnosis:    Acute pain of left shoulder  Pain in left wrist  Medical/Referring Diagnosis:    Closed fracture of shaft of left humerus, unspecified fracture morphology, initial encounter [S42.302A]  Left wrist pain [M25.532]  Referring Physician:  Huy Matson MD MD Orders:   Evaluate & Treat:  -Left Shoulder: Full Active/Passive ROM and Full Strengthening;   -Left Wrist: Full Active/Gentle Passive ROM and NO Strenghtening.  Return MD Appt:  2024   Date of Onset:  Onset Date: 24 (Surgery: 2024)  Allergies:   Shellfish allergy and Penicillins  Restrictions/Precautions:   No restrictions for L shoulder; strengthening restrictions for L wrist      Interventions Planned (Treatment may consist of any combination of the following):     See Assessment Note    Subjective Comments:   Patient reports he is ready to get his arm moving.  Initial Pain Level:: Left Shoulder, Wrist 3/10  Post Session Pain Level:  Left  Shoulder, Wrist 3/10  Medications Last Reviewed:  2/15/2024  Updated Objective Findings:  See Evaluation Note from today  Treatment   THERAPEUTIC EXERCISE: (15 minutes):    Exercises per grid below to improve mobility and

## 2024-02-20 ENCOUNTER — HOSPITAL ENCOUNTER (OUTPATIENT)
Dept: PHYSICAL THERAPY | Age: 64
Setting detail: RECURRING SERIES
Discharge: HOME | End: 2024-02-23
Attending: ORTHOPAEDIC SURGERY
Payer: COMMERCIAL

## 2024-02-20 PROCEDURE — 97110 THERAPEUTIC EXERCISES: CPT

## 2024-02-20 ASSESSMENT — PAIN SCALES - GENERAL: PAINLEVEL_OUTOF10: 4

## 2024-02-20 NOTE — PROGRESS NOTES
Mendel Ray McGill  : 1960  Primary: Christopher Nash (Gisela ALCALA)  Secondary:  [facility] @ [address] [place]  [place] [address]  Phone: [phone]  Fax: [phone] Plan Frequency: 2 times per week  Plan of Care/Certification Expiration Date: 05/15/24        Plan of Care/Certification Expiration Date:  Plan of Care/Certification Expiration Date: 05/15/24    Frequency/Duration: Plan Frequency: 2 times per week      Time In/Out:   Time In: 930  Time Out: 1020      PT Visit Info:    Progress Note Due Date: 24  Total # of Visits to Date: 2  Progress Note Counter: 2      Visit Count:  2    OUTPATIENT PHYSICAL THERAPY:   Treatment Note 2024       Episode  (PT: Left humeral fracture)               Treatment Diagnosis:    Acute pain of left shoulder  Pain in left wrist  Medical/Referring Diagnosis:    Closed fracture of shaft of left humerus, unspecified fracture morphology, initial encounter [S42.302A]  Left wrist pain [M25.532]  Referring Physician:  Huy Matson MD MD Orders:   Evaluate & Treat:  -Left Shoulder: Full Active/Passive ROM and Full Strengthening;   -Left Wrist: Full Active/Gentle Passive ROM and NO Strenghtening.  Return MD Appt:  2024   Date of Onset:  Onset Date: 24 (Surgery: 2024)  Allergies:   Shellfish allergy and Penicillins  Restrictions/Precautions:   No restrictions for L shoulder; strengthening restrictions for L wrist      Interventions Planned (Treatment may consist of any combination of the following):     See Assessment Note    Subjective Comments:   Patient states his shoulder and wrist are feeling ok.  He states the wall slide exercise is hard to do.  Initial Pain Level::     4/10  Post Session Pain Level:       4/10  Medications Last Reviewed:  2024  Updated Objective Findings:  None Today  Treatment   THERAPEUTIC EXERCISE: (40 minutes):    Exercises per grid below to improve mobility and

## 2024-02-21 ENCOUNTER — OFFICE VISIT (OUTPATIENT)
Dept: ORTHOPEDIC SURGERY | Age: 64
End: 2024-02-21
Payer: COMMERCIAL

## 2024-02-21 DIAGNOSIS — M65.342 TRIGGER FINGER, LEFT RING FINGER: ICD-10-CM

## 2024-02-21 DIAGNOSIS — M65.341 TRIGGER FINGER, RIGHT RING FINGER: Primary | ICD-10-CM

## 2024-02-21 PROCEDURE — 99213 OFFICE O/P EST LOW 20 MIN: CPT | Performed by: ORTHOPAEDIC SURGERY

## 2024-02-21 NOTE — PROGRESS NOTES
Orthopaedic Hand Surgery Note    Name: Mendel Ray McGill  Age: 63 y.o.  YOB: 1960  Gender: male  MRN: [id number]    CC: Follow up for trigger finger    HPI: Patient is a 63 y.o. male who returns today for reevaluation of a bilateral ring trigger finger. Last visit we provided surgical discussion, he reports his fingers click and lock occasionally but they do not lock as bad as they used to and they are not causing much pain, he is recovering from left proximal humerus surgery and left distal radius that was treated nonop.    ROS/Meds/PSH/PMH/FH/SH: I personally reviewed the patients standard intake form.  Pertinents are discussed in the HPI    Physical Examination:  General: Awake and alert.  HEENT: Normocephalic, atraumatic  CV/Pulm: Breathing even and unlabored  Skin: No obvious rashes noted.  Lymphatic: No obvious evidence of lymphedema or lymphadenopathy    Musculoskeletal:   Examination on the bilateral upper extremity demonstrates, Normal sensation and good cap refill in all fingers, minimal tenderness over the bilateral ring A1 pulley with palpable clicking and Negative  locking.    Imaging / Electrodiagnostic Tests:     none    Assessment:   1. Trigger finger, right ring finger    2. Trigger finger, left ring finger        Plan:  We discussed the diagnosis and different treatment options. We discussed observation, splinting, cortisone injections and surgical release of the A1 pulley. We discussed that stenosing tenosynovitis at the level of the A1 pulley AKA trigger finger is a chronic condition regardless of how long the symptoms have been present, this most likely has been progressing for much longer than the symptoms were evident and it will likely persist for a long time without medical treatment. Furthermore, the vast majority of patients require surgical release at some point despite some short-term benefits of conservative treatment.  After discussing in detail the patient elects to

## 2024-02-22 ENCOUNTER — HOSPITAL ENCOUNTER (OUTPATIENT)
Dept: PHYSICAL THERAPY | Age: 64
Setting detail: RECURRING SERIES
Discharge: HOME | End: 2024-02-25
Attending: ORTHOPAEDIC SURGERY
Payer: COMMERCIAL

## 2024-02-22 PROCEDURE — 97110 THERAPEUTIC EXERCISES: CPT

## 2024-02-22 ASSESSMENT — PAIN SCALES - GENERAL: PAINLEVEL_OUTOF10: 0

## 2024-02-22 NOTE — PROGRESS NOTES
below to improve mobility and strength.  Required minimal visual, verbal, and manual cues to promote proper body alignment, promote proper body posture, and promote proper body mechanics.  Progressed resistance, range, repetitions, and complexity of movement as indicated.   Date:  2/15/2024 Date:  02-20-24 Date:  02-22-24   Activity/Exercise Parameters     Pendulums Clockwise and counter-clockwise  10 reps each  L UE  HEP Circles clockwise and counterclockwise  20 reps each  L UE Circles Clockwise/Counterclockwise  20 reps each  L UE   Wall slides 10 reps to tolerance  L UE  HEP     PROM  L shoulder to tolerance in all planes L shoulder to tolerance in all planes L shoulder to tolerance in all planes   Gentle AROM L Hand Gripping  15 reps 20 reps   Gentle AROM L Wrist Flexion/Extension and Radial Deviation/Ulnar Deviation  15 reps each 20 reps each   Gentle AROM L Forearm Pronation/Supination  15 reps 20 reps   PROM Elbow Flexion and Extension  Gentle L elbow Gentle L elbow   Wand Flexion in Supine  10 reps  5 sec holds L hand underhand   15 reps  5 sec holds   Wand External Rotation in Supine  10 reps  5 sec holds 15 reps  5 sec holds   Scapular Retraction  15 reps 20 reps   Seated Towel Slides on Burgundy Wedge   15 reps      MODALITIES: (10 minutes - L UE/shoulder)       *  Cold Pack Therapy in order to provide analgesia and reduce inflammation and edema.  Game Ready cold pack to L shoulder x10 minutes.  Skin clear afterwards.    Treatment/Session Summary:    Treatment Assessment:   Patient tolerated session well.  Mobility L UE continues to slowly improve.  Communication/Consultation:  None today  Equipment provided today:  None  Recommendations/Intent for next treatment session: Next visit will focus on improving overall mobility, strength, and pain with daily activities.    >Total Treatment Billable Duration:  40 minutes  Time In: 0930  Time Out: 1020     Bassam Zuniga PT         Charge Capture  [employer]

## 2024-02-27 ENCOUNTER — HOSPITAL ENCOUNTER (OUTPATIENT)
Dept: PHYSICAL THERAPY | Age: 64
Setting detail: RECURRING SERIES
Discharge: HOME | End: 2024-03-01
Attending: ORTHOPAEDIC SURGERY
Payer: COMMERCIAL

## 2024-02-27 PROCEDURE — 97110 THERAPEUTIC EXERCISES: CPT

## 2024-02-27 ASSESSMENT — PAIN SCALES - GENERAL: PAINLEVEL_OUTOF10: 0

## 2024-02-27 NOTE — PROGRESS NOTES
activities.    >Total Treatment Billable Duration:  40 minutes  Time In: 0930  Time Out: 1020     Bassam Zuniga, PT         Charge Capture  [employer]  Appt Desk     Future Appointments   Date Time Provider Department Center   2/29/2024 11:00 AM Huy Matson MD [facility]   3/1/2024  9:30 AM FredaRebeca garciafer N, PT [facility]   3/5/2024 10:15 AM FredaCeciliaBrittani N, PT [facility]   3/7/2024 10:15 AM Freda, Brittani N, PT [facility]   3/12/2024 10:15 AM Freda, Brittani N, PT [facility]   3/14/2024 10:15 AM FredaCeciliaBrittani N, PT [facility]   3/19/2024  9:30 AM FredaCeciliaBrittani N, PT [facility]   3/21/2024  9:30 AM FredaCeciliaBrittani N, PT [facility]   3/26/2024  9:30 AM Freda Brittani N, PT [facility]   3/28/2024  9:30 AM FredaCeciliaBrittani N, PT [facility]   4/2/2024  9:30 AM FredaCeciliaBrittani N, PT [facility]   4/4/2024  9:30 AM FredaCeciliaBrittani N, PT [facility]   4/9/2024  9:30 AM FredaCeciliaBrittani N, PT [facility]   4/11/2024  9:30 AM Freda Brittani N, PT [facility]   5/22/2024  9:30 AM Karen Ac MD [facility]

## 2024-02-29 ENCOUNTER — OFFICE VISIT (OUTPATIENT)
Dept: ORTHOPEDIC SURGERY | Age: 64
End: 2024-02-29

## 2024-02-29 DIAGNOSIS — M25.532 LEFT WRIST PAIN: ICD-10-CM

## 2024-02-29 DIAGNOSIS — S42.302A CLOSED FRACTURE OF SHAFT OF LEFT HUMERUS, UNSPECIFIED FRACTURE MORPHOLOGY, INITIAL ENCOUNTER: Primary | ICD-10-CM

## 2024-02-29 PROCEDURE — 99024 POSTOP FOLLOW-UP VISIT: CPT | Performed by: ORTHOPAEDIC SURGERY

## 2024-02-29 NOTE — PROGRESS NOTES
Progress Note    Patient: Mendel Ray McGill MRN: [id number]  SSN: [id number]    YOB: 1960  Age: 63 y.o.  Sex: male        2/29/2024      Subjective:     Patient is here today in follow-up.  He is about 6 weeks out from intramedullary nail fixation of the left humerus fracture by Dr. Freire as well as nonoperative treatment of the left distal radius fracture.  He really is doing remarkably well.  His shoulder is giving him very little difficulty no pain he still was having some trouble regaining his motion.  His hand and wrist are also little bit stiff but he thinks both of these are getting better with therapy.    Objective:     There were no vitals filed for this visit.       Physical Exam:     Skin - incision is well healed with no redness or drainage  Motor and sensory function intact in LEFT UPPER extremity  Pulses palpable in LEFT UPPER extremity     XRAY FINDINGS:  [id number] left distal radius fracture, findings-true AP and lateral views of the left wrist shows a left distal radius fracture is very well aligned on both AP and lateral projection.  There is significant evidence of healing on both AP and lateral projection.  Impression-healing well aligned left distal radius fracture    Nhvimzsknij-[id number] left proximal humerus fracture, findings-AP and lateral views of the left shoulder and proximal humerus shows the left humerus fracture shows significant evidence of healing at the primary fracture lines.  The overall alignment is near anatomic.  The hardware is intact with no evidence of loosening or failure.  Impression-healing well aligned left humeral shaft fracture    Assessment:     Healing left humerus and left distal radius fractures now about 5 weeks out    Plan:     I think he can sort of advanced with this therapy now.  I think he can be full active and aggressive passive range of motion of the left shoulder and full strengthening of the left shoulder.  In short he has

## 2024-03-01 ENCOUNTER — HOSPITAL ENCOUNTER (OUTPATIENT)
Dept: PHYSICAL THERAPY | Age: 64
Setting detail: RECURRING SERIES
Discharge: HOME | End: 2024-03-04
Attending: ORTHOPAEDIC SURGERY
Payer: COMMERCIAL

## 2024-03-01 PROCEDURE — 97110 THERAPEUTIC EXERCISES: CPT

## 2024-03-01 ASSESSMENT — PAIN DESCRIPTION - ORIENTATION: ORIENTATION: LEFT

## 2024-03-01 ASSESSMENT — PAIN DESCRIPTION - LOCATION: LOCATION: SHOULDER;WRIST

## 2024-03-01 ASSESSMENT — PAIN SCALES - GENERAL: PAINLEVEL_OUTOF10: 0

## 2024-03-01 NOTE — PROGRESS NOTES
Mendel Ray McGill  : 1960  Primary: Christopher Nash (Gisela ALCALA)  Secondary:  [facility] @ [address]  [place] [address]  Phone: [phone]  Fax: [phone] Plan Frequency: 2 times per week  Plan of Care/Certification Expiration Date: 05/15/24        Plan of Care/Certification Expiration Date:  Plan of Care/Certification Expiration Date: 05/15/24    Frequency/Duration: Plan Frequency: 2 times per week      Time In/Out:   Time In: 930  Time Out: 1015      PT Visit Info:    Total # of Visits Approved: 17  Progress Note Due Date: 24  Total # of Visits to Date: 5  Progress Note Counter: 5      Visit Count:  5    OUTPATIENT PHYSICAL THERAPY:   Treatment Note 3/1/2024       Episode  (PT: Left humeral fracture)               Treatment Diagnosis:    Acute pain of left shoulder  Pain in left wrist  Medical/Referring Diagnosis:    Closed fracture of shaft of left humerus, unspecified fracture morphology, initial encounter [S42.302A]  Left wrist pain [M25.532]  Referring Physician:  Huy Matson MD MD Orders:   Evaluate & Treat:  -Left Shoulder: Full Active/Passive ROM and Full Strengthening;   -Left Wrist: Full Active/Passive ROM and Full Strengthening.  Return MD Appt:  2024   Date of Onset:  Onset Date: 24 (Surgery: 2024)  Allergies:   Shellfish allergy and Penicillins  Restrictions/Precautions:   No restrictions for L shoulder; strengthening restrictions for L wrist      Interventions Planned (Treatment may consist of any combination of the following):     See Assessment Note    Subjective Comments:   Patient reports he is doing ok today.  He reports the MD has cleared him for all ROM and strengthening for his shoulder and wrist.  Initial Pain Level:: Left Shoulder, Wrist 0/10 (at rest)  Post Session Pain Level:  Left  Shoulder, Wrist 3/10 (at rest)  Medications Last Reviewed:  3/1/2024  Updated Objective Findings:  None

## 2024-03-05 ENCOUNTER — HOSPITAL ENCOUNTER (OUTPATIENT)
Dept: PHYSICAL THERAPY | Age: 64
Setting detail: RECURRING SERIES
Discharge: HOME | End: 2024-03-08
Attending: ORTHOPAEDIC SURGERY
Payer: COMMERCIAL

## 2024-03-05 PROCEDURE — 97110 THERAPEUTIC EXERCISES: CPT

## 2024-03-05 ASSESSMENT — PAIN DESCRIPTION - LOCATION: LOCATION: SHOULDER;WRIST

## 2024-03-05 ASSESSMENT — PAIN SCALES - GENERAL: PAINLEVEL_OUTOF10: 1

## 2024-03-05 ASSESSMENT — PAIN DESCRIPTION - ORIENTATION: ORIENTATION: LEFT

## 2024-03-05 NOTE — PROGRESS NOTES
Mendel Ray McGill  : 1960  Primary: Christopher Nash (Gisela ALCALA)  Secondary:  [facility] @ [address]  [place] [address]  Phone: [phone]  Fax: [phone] Plan Frequency: 2 times per week  Plan of Care/Certification Expiration Date: 05/15/24        Plan of Care/Certification Expiration Date:  Plan of Care/Certification Expiration Date: 05/15/24    Frequency/Duration: Plan Frequency: 2 times per week      Time In/Out:   Time In: 1015  Time Out: 1100      PT Visit Info:    Total # of Visits Approved: 17  Progress Note Due Date: 24  Total # of Visits to Date: 6  Progress Note Counter: 6      Visit Count:  6    OUTPATIENT PHYSICAL THERAPY:   Treatment Note 3/5/2024       Episode  (PT: Left humeral fracture)               Treatment Diagnosis:    Acute pain of left shoulder  Pain in left wrist  Medical/Referring Diagnosis:    Closed fracture of shaft of left humerus, unspecified fracture morphology, initial encounter [S42.302A]  Left wrist pain [M25.532]  Referring Physician:  Huy Matson MD MD Orders:   Evaluate & Treat:  -Left Shoulder: Full Active/Passive ROM and Full Strengthening;   -Left Wrist: Full Active/Passive ROM and Full Strengthening.  Return MD Appt:  2024   Date of Onset:  Onset Date: 24 (Surgery: 2024)  Allergies:   Shellfish allergy and Penicillins  Restrictions/Precautions:   No restrictions for L shoulder; strengthening restrictions for L wrist      Interventions Planned (Treatment may consist of any combination of the following):     See Assessment Note    Subjective Comments:   Patient reports he is doing ok today.  Initial Pain Level:: Left Shoulder, Wrist 1/10 (at rest)  Post Session Pain Level:  Left  Shoulder, Wrist 2/10 (at rest)  Medications Last Reviewed:  3/5/2024  Updated Objective Findings:  None Today  Treatment   THERAPEUTIC EXERCISE: (40 minutes):    Exercises per grid below to improve

## 2024-03-07 ENCOUNTER — APPOINTMENT (OUTPATIENT)
Dept: PHYSICAL THERAPY | Age: 64
End: 2024-03-07
Attending: ORTHOPAEDIC SURGERY
Payer: COMMERCIAL

## 2024-03-07 ENCOUNTER — HOSPITAL ENCOUNTER (OUTPATIENT)
Dept: PHYSICAL THERAPY | Age: 64
Setting detail: RECURRING SERIES
Discharge: HOME | End: 2024-03-10
Attending: ORTHOPAEDIC SURGERY
Payer: COMMERCIAL

## 2024-03-07 PROCEDURE — 97110 THERAPEUTIC EXERCISES: CPT

## 2024-03-07 ASSESSMENT — PAIN DESCRIPTION - LOCATION: LOCATION: SHOULDER;WRIST

## 2024-03-07 ASSESSMENT — PAIN SCALES - GENERAL: PAINLEVEL_OUTOF10: 1

## 2024-03-07 ASSESSMENT — PAIN DESCRIPTION - ORIENTATION: ORIENTATION: LEFT

## 2024-03-07 NOTE — PROGRESS NOTES
3/7/2024  Updated Objective Findings:  None Today  Treatment   THERAPEUTIC EXERCISE: (30 minutes):    Exercises per grid below to improve mobility and strength.  Required minimal visual, verbal, and manual cues to promote proper body alignment, promote proper body posture, and promote proper body mechanics.  Progressed resistance, range, repetitions, and complexity of movement as indicated.   Date:  3/7/2024   Activity/Exercise Parameters   Melita's  15 reps to tolerance in flexion   Wall slides 15 reps to tolerance in flexion   Theraband rows Yellow t-band  15 reps   Theraband pulls Yellow t-band  15 reps   Theraband internal rotation Yellow t-band  15 reps  L UE   Theraband external rotation Yellow t-band  15 reps  L UE   Bicep curls 3 pounds  15 reps  B UE   Bent over rows 3 pounds  15 reps  L UE   Standing wrist prontation/supination 3 pounds  15 reps  B UE    Green gripper  15 reps  L UE   Wall push-ups To tolerance  15 reps   Wand flexion To tolerance  15 reps   Wand press ups 15 reps   Wand external rotation 15 reps to tolerance  L UE      MANUAL THERAPY: (5 minutes - L UE):   Joint mobilization and Soft tissue mobilization was utilized and necessary because of the patient's restricted joint motion and restricted motion of soft tissue.     Treatment/Session Summary:    Treatment Assessment:   Patient tolerated treatment well with improving overall mobility and strength.  Communication/Consultation:  None today  Equipment provided today:  None  Recommendations/Intent for next treatment session: Next visit will focus on improving overall mobility, strength, and pain with daily activities.    >Total Treatment Billable Duration:  35 minutes  Time In: 1015  Time Out: 1050     BRITTANI AVENDAÑO PT         Charge Capture  [employer] Portal  Appt Desk     Future Appointments   Date Time Provider Department Center   3/12/2024 10:15 AM Birttani Avendaño PT [facility]   3/14/2024 10:15 AM Brittani Avendaño PT [facility]

## 2024-03-12 ENCOUNTER — HOSPITAL ENCOUNTER (OUTPATIENT)
Dept: PHYSICAL THERAPY | Age: 64
Setting detail: RECURRING SERIES
Discharge: HOME | End: 2024-03-15
Attending: ORTHOPAEDIC SURGERY
Payer: COMMERCIAL

## 2024-03-12 PROCEDURE — 97140 MANUAL THERAPY 1/> REGIONS: CPT

## 2024-03-12 PROCEDURE — 97110 THERAPEUTIC EXERCISES: CPT

## 2024-03-12 ASSESSMENT — PAIN SCALES - GENERAL: PAINLEVEL_OUTOF10: 1

## 2024-03-12 ASSESSMENT — PAIN DESCRIPTION - ORIENTATION: ORIENTATION: LEFT

## 2024-03-12 ASSESSMENT — PAIN DESCRIPTION - LOCATION: LOCATION: SHOULDER;WRIST

## 2024-03-12 NOTE — PROGRESS NOTES
Mendel Ray McGill  : 1960  Primary: Christopher Nash (Gisela ALCALA)  Secondary:  [facility] @ [address]  [place] [address]  Phone: [phone]  Fax: [phone] Plan Frequency: 2 times per week  Plan of Care/Certification Expiration Date: 05/15/24        Plan of Care/Certification Expiration Date:  Plan of Care/Certification Expiration Date: 05/15/24    Frequency/Duration: Plan Frequency: 2 times per week      Time In/Out:   Time In: 1015  Time Out: 1055      PT Visit Info:    Total # of Visits Approved: 17  Progress Note Due Date: 24  Total # of Visits to Date: 8  Progress Note Counter: 1      Visit Count:  8    OUTPATIENT PHYSICAL THERAPY:   Treatment Note 3/12/2024       Episode  (PT: Left humeral fracture)               Treatment Diagnosis:    Acute pain of left shoulder  Pain in left wrist  Medical/Referring Diagnosis:    Closed fracture of shaft of left humerus, unspecified fracture morphology, initial encounter [S42.302A]  Left wrist pain [M25.532]  Referring Physician:  Huy Matson MD MD Orders:   Evaluate & Treat:  -Left Shoulder: Full Active/Passive ROM and Full Strengthening;   -Left Wrist: Full Active/Passive ROM and Full Strengthening.  Return MD Appt:  2024   Date of Onset:  Onset Date: 24 (Surgery: 2024)  Allergies:   Shellfish allergy and Penicillins  Restrictions/Precautions:   No restrictions for L shoulder; strengthening restrictions for L wrist      Interventions Planned (Treatment may consist of any combination of the following):     See Assessment Note    Subjective Comments:   Patient reports he is doing ok today.  Initial Pain Level:: Left Shoulder, Wrist 1/10 (at rest)  Post Session Pain Level:  Left  Shoulder, Wrist 1/10 (at rest)  Medications Last Reviewed:  3/12/2024  Updated Objective Findings:  None Today  Treatment   THERAPEUTIC EXERCISE: (30 minutes):    Exercises per grid below to improve

## 2024-03-12 NOTE — PROGRESS NOTES
Mendel Ray McGill  : 1960  Primary: Christopher aNsh (Gisela ALCALA)  Secondary:  [facility] @ [address]  [place] [address]  Phone: [phone]  Fax: [phone] Plan Frequency: 2 times per week  Plan of Care/Certification Expiration Date: 05/15/24        Plan of Care/Certification Expiration Date:  Plan of Care/Certification Expiration Date: 05/15/24    Frequency/Duration: Plan Frequency: 2 times per week      Time In/Out:   Time In: 1015  Time Out: 1055      PT Visit Info:    Total # of Visits Approved: 17  Progress Note Due Date: 24  Total # of Visits to Date: 8  Progress Note Counter: 1      Visit Count:  8                OUTPATIENT PHYSICAL THERAPY:             Progress Report 3/12/2024               Episode (PT: Left humeral fracture)         Treatment Diagnosis:     Acute pain of left shoulder  Pain in left wrist  Medical/Referring Diagnosis:    Closed fracture of shaft of left humerus, unspecified fracture morphology, initial encounter [S42.302A]  Left wrist pain [M25.532]  Referring Physician:  Huy Matson MD MD Orders:  Evaluate & Treat:  -Left Shoulder: Full Active/Passive ROM and Full Strengthening;   -Left Wrist: Full Active/Gentle Passive ROM and NO Strenghtening.   Return MD Appt:  2024  Date of Onset:  Onset Date: 24 (Surgery: 2024)  Allergies:  Shellfish allergy and Penicillins  Restrictions/Precautions:    No restrictions for L shoulder; strengthening restrictions for L wrist      Medications Last Reviewed:  3/12/2024     SUBJECTIVE   History of Injury/Illness (Reason for Referral):  Patient reports he was on an A-frame ladder when he fell about 6 feet to the ground and landed on his left side.  He reports that he isn't really sure how he fell, but when he tried to move his arm he couldn't and so he knew something was wrong. He reports his son took him to the ER.  He reports the x-rays showed the

## 2024-03-14 ENCOUNTER — HOSPITAL ENCOUNTER (OUTPATIENT)
Dept: PHYSICAL THERAPY | Age: 64
Setting detail: RECURRING SERIES
Discharge: HOME | End: 2024-03-17
Attending: ORTHOPAEDIC SURGERY
Payer: COMMERCIAL

## 2024-03-14 PROCEDURE — 97110 THERAPEUTIC EXERCISES: CPT

## 2024-03-14 PROCEDURE — 97140 MANUAL THERAPY 1/> REGIONS: CPT

## 2024-03-14 ASSESSMENT — PAIN SCALES - GENERAL: PAINLEVEL_OUTOF10: 1

## 2024-03-14 ASSESSMENT — PAIN DESCRIPTION - LOCATION: LOCATION: SHOULDER;WRIST

## 2024-03-14 ASSESSMENT — PAIN DESCRIPTION - ORIENTATION: ORIENTATION: LEFT

## 2024-03-14 NOTE — PROGRESS NOTES
mobility and strength.  Required minimal visual, verbal, and manual cues to promote proper body alignment, promote proper body posture, and promote proper body mechanics.  Progressed resistance, range, repetitions, and complexity of movement as indicated.   Date:  3/14/2024   Activity/Exercise Parameters   Melita's  15 reps to tolerance in flexion   Wall slides 15 reps to tolerance in flexion   UBE 6 minutes  Level 1   Theraband rows Green t-band  15 reps   Theraband pulls Green t-band  15 reps   Theraband internal rotation Green t-band  15 reps  L UE   Theraband external rotation Green t-band  15 reps  L UE   Bicep curls 5 pounds  15 reps  B UE   Bent over rows 5 pounds  15 reps  L UE   Bent over extension 5 pounds  15 reps  L UE   Standing wrist prontation/supination 3 pounds  15 reps  B UE    Green gripper  15 reps  L UE   Wall push-ups To tolerance  15 reps   Wand flexion To tolerance in standing  15 reps      MANUAL THERAPY: (14 minutes - L UE):   Joint mobilization and Soft tissue mobilization was utilized and necessary because of the patient's restricted joint motion and restricted motion of soft tissue.     Treatment/Session Summary:    Treatment Assessment:   Patient tolerated treatment well.  Verbal cues for scapular position and movement with UE flexion to improve overall mobility without pain.  Communication/Consultation:  None today  Equipment provided today:  None  Recommendations/Intent for next treatment session: Next visit will focus on improving overall mobility, strength, and pain with daily activities.    >Total Treatment Billable Duration:  44 minutes  Time In: 1017  Time Out: 1101     BRITTANI AVENDAÑO PT         Charge Capture  [employer] Portal  Appt Desk     Future Appointments   Date Time Provider Department Center   3/19/2024  9:30 AM Brittani Avendaño PT [facility]   3/21/2024  9:30 AM Brittani Avendaño PT [facility]   3/26/2024  9:30 AM Brittani Avendaño PT [facility]   3/28/2024

## 2024-03-19 ENCOUNTER — HOSPITAL ENCOUNTER (OUTPATIENT)
Dept: PHYSICAL THERAPY | Age: 64
Setting detail: RECURRING SERIES
Discharge: HOME | End: 2024-03-22
Attending: ORTHOPAEDIC SURGERY
Payer: COMMERCIAL

## 2024-03-19 PROCEDURE — 97110 THERAPEUTIC EXERCISES: CPT

## 2024-03-19 PROCEDURE — 97140 MANUAL THERAPY 1/> REGIONS: CPT

## 2024-03-19 ASSESSMENT — PAIN SCALES - GENERAL: PAINLEVEL_OUTOF10: 2

## 2024-03-19 ASSESSMENT — PAIN DESCRIPTION - LOCATION: LOCATION: SHOULDER;WRIST

## 2024-03-19 ASSESSMENT — PAIN DESCRIPTION - ORIENTATION: ORIENTATION: LEFT

## 2024-03-19 NOTE — PROGRESS NOTES
Mendel Ray McGill  : 1960  Primary: Christopher Nash (Gisela ALCALA)  Secondary:  [facility] @ [address]  [place] [address]  Phone: [phone]  Fax: [phone] Plan Frequency: 2 times per week  Plan of Care/Certification Expiration Date: 05/15/24        Plan of Care/Certification Expiration Date:  Plan of Care/Certification Expiration Date: 05/15/24    Frequency/Duration: Plan Frequency: 2 times per week      Time In/Out:   Time In: 930  Time Out: 1015      PT Visit Info:    Total # of Visits Approved: 17  Progress Note Due Date: 24  Total # of Visits to Date: 10  Progress Note Counter: 3      Visit Count:  10    OUTPATIENT PHYSICAL THERAPY:   Treatment Note 3/19/2024       Episode  (PT: Left humeral fracture)               Treatment Diagnosis:    Acute pain of left shoulder  Pain in left wrist  Medical/Referring Diagnosis:    Closed fracture of shaft of left humerus, unspecified fracture morphology, initial encounter [S42.302A]  Left wrist pain [M25.532]  Referring Physician:  Huy Matson MD MD Orders:   Evaluate & Treat:  -Left Shoulder: Full Active/Passive ROM and Full Strengthening;   -Left Wrist: Full Active/Passive ROM and Full Strengthening.  Return MD Appt:  TBD  Date of Onset:  Onset Date: 24 (Surgery: 2024)  Allergies:   Shellfish allergy and Penicillins  Restrictions/Precautions:   No restrictions for L shoulder; strengthening restrictions for L wrist      Interventions Planned (Treatment may consist of any combination of the following):     See Assessment Note    Subjective Comments:   Patient reports he is working on exercising more overall.  He reports he went for a walk the other day and carried a two pound weight in his hand.  He reports he wasn't too sore afterwards.  Initial Pain Level:: Left Shoulder, Wrist 2/10 (at rest)  Post Session Pain Level:  Left  Shoulder, Wrist 2/10 (at rest)  Medications Last Reviewed:

## 2024-03-21 ENCOUNTER — HOSPITAL ENCOUNTER (OUTPATIENT)
Dept: PHYSICAL THERAPY | Age: 64
Setting detail: RECURRING SERIES
Discharge: HOME | End: 2024-03-24
Attending: ORTHOPAEDIC SURGERY
Payer: COMMERCIAL

## 2024-03-21 PROCEDURE — 97110 THERAPEUTIC EXERCISES: CPT

## 2024-03-21 PROCEDURE — 97140 MANUAL THERAPY 1/> REGIONS: CPT

## 2024-03-21 ASSESSMENT — PAIN DESCRIPTION - LOCATION: LOCATION: SHOULDER;WRIST

## 2024-03-21 ASSESSMENT — PAIN DESCRIPTION - ORIENTATION: ORIENTATION: LEFT

## 2024-03-21 ASSESSMENT — PAIN SCALES - GENERAL: PAINLEVEL_OUTOF10: 3

## 2024-03-21 NOTE — PROGRESS NOTES
Mendel Ray McGill  : 1960  Primary: Christopher Nash (Gisela ALCALA)  Secondary:  [facility] @ [address]  [place] [address]  Phone: [phone]  Fax: [phone] Plan Frequency: 2 times per week  Plan of Care/Certification Expiration Date: 05/15/24        Plan of Care/Certification Expiration Date:  Plan of Care/Certification Expiration Date: 05/15/24    Frequency/Duration: Plan Frequency: 2 times per week      Time In/Out:   Time In: 930  Time Out: 1015      PT Visit Info:    Total # of Visits Approved: 17  Progress Note Due Date: 24  Total # of Visits to Date: 11  Progress Note Counter: 4      Visit Count:  11    OUTPATIENT PHYSICAL THERAPY:   Treatment Note 3/21/2024       Episode  (PT: Left humeral fracture)               Treatment Diagnosis:    Acute pain of left shoulder  Pain in left wrist  Medical/Referring Diagnosis:    Closed fracture of shaft of left humerus, unspecified fracture morphology, initial encounter [S42.302A]  Left wrist pain [M25.532]  Referring Physician:  Huy Matson MD MD Orders:   Evaluate & Treat:  -Left Shoulder: Full Active/Passive ROM and Full Strengthening;   -Left Wrist: Full Active/Passive ROM and Full Strengthening.  Return MD Appt:  TBD  Date of Onset:  Onset Date: 24 (Surgery: 2024)  Allergies:   Shellfish allergy and Penicillins  Restrictions/Precautions:   No restrictions for L shoulder; strengthening restrictions for L wrist      Interventions Planned (Treatment may consist of any combination of the following):     See Assessment Note    Subjective Comments:   Patient reports he is doing well and working on his exercises.  Initial Pain Level:: Left Shoulder, Wrist 3/10 (at rest)  Post Session Pain Level:  Left  Shoulder, Wrist 2/10 (at rest)  Medications Last Reviewed:  3/21/2024  Updated Objective Findings:  None Today  Treatment   THERAPEUTIC EXERCISE: (30 minutes):    Exercises per grid below

## 2024-03-26 ENCOUNTER — HOSPITAL ENCOUNTER (OUTPATIENT)
Dept: PHYSICAL THERAPY | Age: 64
Setting detail: RECURRING SERIES
Discharge: HOME | End: 2024-03-29
Attending: ORTHOPAEDIC SURGERY
Payer: COMMERCIAL

## 2024-03-26 PROCEDURE — 97110 THERAPEUTIC EXERCISES: CPT

## 2024-03-26 PROCEDURE — 97140 MANUAL THERAPY 1/> REGIONS: CPT

## 2024-03-26 ASSESSMENT — PAIN DESCRIPTION - LOCATION: LOCATION: SHOULDER;WRIST

## 2024-03-26 ASSESSMENT — PAIN DESCRIPTION - ORIENTATION: ORIENTATION: LEFT

## 2024-03-26 ASSESSMENT — PAIN SCALES - GENERAL: PAINLEVEL_OUTOF10: 3

## 2024-03-26 NOTE — PROGRESS NOTES
Mendel Ray McGill  : 1960  Primary: Christopher Nash (Gisela ALCALA)  Secondary:  [facility] @ [address]  [place] [address]  Phone: [phone]  Fax: [phone] Plan Frequency: 2 times per week  Plan of Care/Certification Expiration Date: 05/15/24        Plan of Care/Certification Expiration Date:  Plan of Care/Certification Expiration Date: 05/15/24    Frequency/Duration: Plan Frequency: 2 times per week      Time In/Out:   Time In: 930  Time Out: 1015      PT Visit Info:    Total # of Visits Approved: 17  Progress Note Due Date: 24  Total # of Visits to Date: 12  Progress Note Counter: 5      Visit Count:  12    OUTPATIENT PHYSICAL THERAPY:   Treatment Note 3/26/2024       Episode  (PT: Left humeral fracture)               Treatment Diagnosis:    Acute pain of left shoulder  Pain in left wrist  Medical/Referring Diagnosis:    Closed fracture of shaft of left humerus, unspecified fracture morphology, initial encounter [S42.302A]  Left wrist pain [M25.532]  Referring Physician:  Huy Matson MD MD Orders:   Evaluate & Treat:  -Left Shoulder: Full Active/Passive ROM and Full Strengthening;   -Left Wrist: Full Active/Passive ROM and Full Strengthening.  Return MD Appt:  TBD  Date of Onset:  Onset Date: 24 (Surgery: 2024)  Allergies:   Shellfish allergy and Penicillins  Restrictions/Precautions:   No restrictions for L shoulder; strengthening restrictions for L wrist      Interventions Planned (Treatment may consist of any combination of the following):     See Assessment Note    Subjective Comments:   Patient reports he is getting more mobility in his shoulder and wrist.    Initial Pain Level:: Left Shoulder, Wrist 3/10 (at rest)  Post Session Pain Level:  Left  Shoulder, Wrist 3/10 (at rest)  Medications Last Reviewed:  3/26/2024  Updated Objective Findings:  None Today  Treatment   THERAPEUTIC EXERCISE: (30 minutes):    Exercises per

## 2024-03-28 ENCOUNTER — HOSPITAL ENCOUNTER (OUTPATIENT)
Dept: PHYSICAL THERAPY | Age: 64
Setting detail: RECURRING SERIES
Discharge: HOME | End: 2024-03-31
Attending: ORTHOPAEDIC SURGERY
Payer: COMMERCIAL

## 2024-03-28 PROCEDURE — 97110 THERAPEUTIC EXERCISES: CPT

## 2024-03-28 PROCEDURE — 97140 MANUAL THERAPY 1/> REGIONS: CPT

## 2024-03-28 ASSESSMENT — PAIN DESCRIPTION - ORIENTATION: ORIENTATION: LEFT

## 2024-03-28 ASSESSMENT — PAIN DESCRIPTION - LOCATION: LOCATION: SHOULDER;WRIST

## 2024-03-28 ASSESSMENT — PAIN SCALES - GENERAL: PAINLEVEL_OUTOF10: 3

## 2024-03-28 NOTE — PROGRESS NOTES
4/16/2024 10:30 AM Huy Matson MD [facility]   4/16/2024  1:00 PM Brittani Barba, PT [facility]   4/18/2024  9:30 AM Brittani Barba, PT [facility]   4/23/2024 10:15 AM Brittani Barba, PT [facility]   4/24/2024  9:30 AM Brittani Barba, PT [facility]   4/30/2024  9:30 AM Brittani Barba, PT [facility]   5/2/2024  9:30 AM Brittani Barba, PT [facility]

## 2024-04-02 ENCOUNTER — HOSPITAL ENCOUNTER (OUTPATIENT)
Dept: PHYSICAL THERAPY | Age: 64
Setting detail: RECURRING SERIES
Discharge: HOME | End: 2024-04-05
Attending: ORTHOPAEDIC SURGERY
Payer: COMMERCIAL

## 2024-04-02 PROCEDURE — 97140 MANUAL THERAPY 1/> REGIONS: CPT

## 2024-04-02 PROCEDURE — 97110 THERAPEUTIC EXERCISES: CPT

## 2024-04-02 ASSESSMENT — PAIN DESCRIPTION - ORIENTATION: ORIENTATION: LEFT

## 2024-04-02 ASSESSMENT — PAIN DESCRIPTION - LOCATION: LOCATION: SHOULDER;WRIST

## 2024-04-02 ASSESSMENT — PAIN SCALES - GENERAL: PAINLEVEL_OUTOF10: 3

## 2024-04-02 NOTE — PROGRESS NOTES
Mendel Ray McGill  : 1960  Primary: Christopher Nash (Gisela ALCALA)  Secondary:  [facility] @ [address]  [place] [address]  Phone: [phone]  Fax: [phone] Plan Frequency: 2 times per week  Plan of Care/Certification Expiration Date: 05/15/24        Plan of Care/Certification Expiration Date:  Plan of Care/Certification Expiration Date: 05/15/24    Frequency/Duration: Plan Frequency: 2 times per week      Time In/Out:   Time In: 930  Time Out: 1010      PT Visit Info:    Total # of Visits Approved: 17  Progress Note Due Date: 24  Total # of Visits to Date: 14  Progress Note Counter: 1      Visit Count:  14                OUTPATIENT PHYSICAL THERAPY:             Progress Report 2024               Episode (PT: Left humeral fracture)         Treatment Diagnosis:     Acute pain of left shoulder  Pain in left wrist  Medical/Referring Diagnosis:    Closed fracture of shaft of left humerus, unspecified fracture morphology, initial encounter [S42.302A]  Left wrist pain [M25.532]  Referring Physician:  Huy Matson MD MD Orders:  Evaluate & Treat:  -Left Shoulder: Full Active/Passive ROM and Full Strengthening;   -Left Wrist: Full Active/Gentle Passive ROM and NO Strenghtening.   Return MD Appt:  2024  Date of Onset:  Onset Date: 24 (Surgery: 2024)  Allergies:  Shellfish allergy and Penicillins  Restrictions/Precautions:    No restrictions for L shoulder; strengthening restrictions for L wrist      Medications Last Reviewed:  2024     SUBJECTIVE   History of Injury/Illness (Reason for Referral):  Patient reports he was on an A-frame ladder when he fell about 6 feet to the ground and landed on his left side.  He reports that he isn't really sure how he fell, but when he tried to move his arm he couldn't and so he knew something was wrong. He reports his son took him to the ER.  He reports the x-rays showed the

## 2024-04-02 NOTE — PROGRESS NOTES
Mendel Ray McGill  : 1960  Primary: Christopher Nash (Gisela ALCALA)  Secondary:  [facility] @ [address]  [place] [address]  Phone: [phone]  Fax: [phone] Plan Frequency: 2 times per week  Plan of Care/Certification Expiration Date: 05/15/24        Plan of Care/Certification Expiration Date:  Plan of Care/Certification Expiration Date: 05/15/24    Frequency/Duration: Plan Frequency: 2 times per week      Time In/Out:   Time In: 930  Time Out: 1010      PT Visit Info:    Total # of Visits Approved: 17  Progress Note Due Date: 24  Total # of Visits to Date: 14  Progress Note Counter: 1      Visit Count:  14    OUTPATIENT PHYSICAL THERAPY:   Treatment Note 2024       Episode  (PT: Left humeral fracture)               Treatment Diagnosis:    Acute pain of left shoulder  Pain in left wrist  Medical/Referring Diagnosis:    Closed fracture of shaft of left humerus, unspecified fracture morphology, initial encounter [S42.302A]  Left wrist pain [M25.532]  Referring Physician:  Huy Matson MD MD Orders:   Evaluate & Treat:  -Left Shoulder: Full Active/Passive ROM and Full Strengthening;   -Left Wrist: Full Active/Passive ROM and Full Strengthening.  Return MD Appt:  TBD  Date of Onset:  Onset Date: 24 (Surgery: 2024)  Allergies:   Shellfish allergy and Penicillins  Restrictions/Precautions:   No restrictions for L shoulder; strengthening restrictions for L wrist      Interventions Planned (Treatment may consist of any combination of the following):     See Assessment Note    Subjective Comments:   Patient reports he is doing well overall.    Initial Pain Level:: Left Shoulder, Wrist 3/10 (at rest)  Post Session Pain Level:  Left  Shoulder, Wrist 3/10 (at rest)  Medications Last Reviewed:  2024  Updated Objective Findings:  None Today  Treatment   THERAPEUTIC EXERCISE: (30 minutes):    Exercises per grid below to improve mobility

## 2024-04-04 ENCOUNTER — HOSPITAL ENCOUNTER (OUTPATIENT)
Dept: PHYSICAL THERAPY | Age: 64
Setting detail: RECURRING SERIES
Discharge: HOME | End: 2024-04-07
Attending: ORTHOPAEDIC SURGERY
Payer: COMMERCIAL

## 2024-04-04 PROCEDURE — 97110 THERAPEUTIC EXERCISES: CPT

## 2024-04-04 PROCEDURE — 97140 MANUAL THERAPY 1/> REGIONS: CPT

## 2024-04-04 ASSESSMENT — PAIN DESCRIPTION - LOCATION: LOCATION: SHOULDER

## 2024-04-04 ASSESSMENT — PAIN SCALES - GENERAL: PAINLEVEL_OUTOF10: 3

## 2024-04-04 ASSESSMENT — PAIN DESCRIPTION - ORIENTATION: ORIENTATION: LEFT

## 2024-04-04 NOTE — PROGRESS NOTES
4/24/2024  9:30 AM Brittani Barba, PT [facility]   4/30/2024  9:30 AM Brittani Barba, PT [facility]   5/2/2024  9:30 AM Brittani Barba, PT [facility]

## 2024-04-09 ENCOUNTER — HOSPITAL ENCOUNTER (OUTPATIENT)
Dept: PHYSICAL THERAPY | Age: 64
Setting detail: RECURRING SERIES
Discharge: HOME | End: 2024-04-12
Attending: ORTHOPAEDIC SURGERY
Payer: COMMERCIAL

## 2024-04-09 PROCEDURE — 97112 NEUROMUSCULAR REEDUCATION: CPT

## 2024-04-09 ASSESSMENT — PAIN SCALES - GENERAL: PAINLEVEL_OUTOF10: 3

## 2024-04-09 ASSESSMENT — PAIN DESCRIPTION - ORIENTATION: ORIENTATION: LEFT

## 2024-04-09 ASSESSMENT — PAIN DESCRIPTION - LOCATION: LOCATION: SHOULDER

## 2024-04-09 NOTE — PROGRESS NOTES
Mendel Ray McGill  : 1960  Primary: Christopher Nash (Gisela ALCALA)  Secondary:  [facility] @ [address]  [place] [address]  Phone: [phone]  Fax: [phone] Plan Frequency: 2 times per week  Plan of Care/Certification Expiration Date: 05/15/24        Plan of Care/Certification Expiration Date:  Plan of Care/Certification Expiration Date: 05/15/24    Frequency/Duration: Plan Frequency: 2 times per week      Time In/Out:   Time In: 930  Time Out: 1010      PT Visit Info:    Total # of Visits Approved: 17  Progress Note Due Date: 24  Total # of Visits to Date: 16  Progress Note Counter: 3      Visit Count:  16    OUTPATIENT PHYSICAL THERAPY:   Treatment Note 2024       Episode  (PT: Left humeral fracture)               Treatment Diagnosis:    Acute pain of left shoulder  Pain in left wrist  Medical/Referring Diagnosis:    Closed fracture of shaft of left humerus, unspecified fracture morphology, initial encounter [S42.302A]  Left wrist pain [M25.532]  Referring Physician:  Huy Matson MD MD Orders:   Evaluate & Treat:  -Left Shoulder: Full Active/Passive ROM and Full Strengthening;   -Left Wrist: Full Active/Passive ROM and Full Strengthening.  Return MD Appt:  TBD  Date of Onset:  Onset Date: 24 (Surgery: 2024)  Allergies:   Shellfish allergy and Penicillins  Restrictions/Precautions:   No restrictions for L shoulder; strengthening restrictions for L wrist      Interventions Planned (Treatment may consist of any combination of the following):     See Assessment Note    Subjective Comments:   Patient reports he is able to help out more at his son's house.  He reports that his left arm is doing well, but his right arm gets fatigued.    Initial Pain Level:: Left Shoulder 3/10 (at rest)  Post Session Pain Level:  Left  Shoulder 3/10 (at rest)  Medications Last Reviewed:  2024  Updated Objective Findings:  None Today  Treatment

## 2024-04-11 ENCOUNTER — HOSPITAL ENCOUNTER (OUTPATIENT)
Dept: PHYSICAL THERAPY | Age: 64
Setting detail: RECURRING SERIES
Discharge: HOME | End: 2024-04-14
Attending: ORTHOPAEDIC SURGERY
Payer: COMMERCIAL

## 2024-04-11 PROCEDURE — 97140 MANUAL THERAPY 1/> REGIONS: CPT

## 2024-04-11 PROCEDURE — 97110 THERAPEUTIC EXERCISES: CPT

## 2024-04-11 ASSESSMENT — PAIN DESCRIPTION - LOCATION: LOCATION: SHOULDER

## 2024-04-11 ASSESSMENT — PAIN DESCRIPTION - ORIENTATION: ORIENTATION: LEFT

## 2024-04-11 ASSESSMENT — PAIN SCALES - GENERAL: PAINLEVEL_OUTOF10: 3

## 2024-04-11 NOTE — PROGRESS NOTES
Mendel Ray McGill  : 1960  Primary: Christopher Nash (Gisela ALCALA)  Secondary:  [facility] @ [address]  [place] [address]  Phone: [phone]  Fax: [phone] Plan Frequency: 2 times per week  Plan of Care/Certification Expiration Date: 05/15/24        Plan of Care/Certification Expiration Date:  Plan of Care/Certification Expiration Date: 05/15/24    Frequency/Duration: Plan Frequency: 2 times per week      Time In/Out:   Time In: 930  Time Out: 1010      PT Visit Info:    Total # of Visits Approved: 17  Progress Note Due Date: 24  Total # of Visits to Date: 17  Progress Note Counter: 4      Visit Count:  17    OUTPATIENT PHYSICAL THERAPY:   Treatment Note 2024       Episode  (PT: Left humeral fracture)               Treatment Diagnosis:    Acute pain of left shoulder  Pain in left wrist  Medical/Referring Diagnosis:    Closed fracture of shaft of left humerus, unspecified fracture morphology, initial encounter [S42.302A]  Left wrist pain [M25.532]  Referring Physician:  Huy Matson MD MD Orders:   Evaluate & Treat:  -Left Shoulder: Full Active/Passive ROM and Full Strengthening;   -Left Wrist: Full Active/Passive ROM and Full Strengthening.  Return MD Appt:  TBD  Date of Onset:  Onset Date: 24 (Surgery: 2024)  Allergies:   Shellfish allergy and Penicillins  Restrictions/Precautions:   No restrictions for L shoulder; strengthening restrictions for L wrist      Interventions Planned (Treatment may consist of any combination of the following):     See Assessment Note    Subjective Comments:   Patient reports he is doing well and returns to the surgeon on Tuesday.    Initial Pain Level:: Left Shoulder 3/10 (at rest)  Post Session Pain Level:  Left  Shoulder 3/10 (at rest)  Medications Last Reviewed:  2024  Updated Objective Findings:  None Today  Treatment   THERAPEUTIC EXERCISE: (30 minutes):    Exercises per grid below to

## 2024-04-16 ENCOUNTER — HOSPITAL ENCOUNTER (OUTPATIENT)
Dept: PHYSICAL THERAPY | Age: 64
Setting detail: RECURRING SERIES
End: 2024-04-16
Attending: ORTHOPAEDIC SURGERY
Payer: COMMERCIAL

## 2024-04-16 ENCOUNTER — OFFICE VISIT (OUTPATIENT)
Dept: ORTHOPEDIC SURGERY | Age: 64
End: 2024-04-16

## 2024-04-16 DIAGNOSIS — S42.302A CLOSED FRACTURE OF SHAFT OF LEFT HUMERUS, UNSPECIFIED FRACTURE MORPHOLOGY, INITIAL ENCOUNTER: Primary | ICD-10-CM

## 2024-04-16 DIAGNOSIS — S52.502A CLOSED FRACTURE OF DISTAL END OF LEFT RADIUS, UNSPECIFIED FRACTURE MORPHOLOGY, INITIAL ENCOUNTER: ICD-10-CM

## 2024-04-16 DIAGNOSIS — M25.532 LEFT WRIST PAIN: ICD-10-CM

## 2024-04-16 PROCEDURE — 99024 POSTOP FOLLOW-UP VISIT: CPT | Performed by: ORTHOPAEDIC SURGERY

## 2024-04-16 NOTE — PROGRESS NOTES
Mendel Ray McGill  : 1960  Primary: [employer]  Secondary:  [facility] @ [address]  [place] [address]  Phone: [phone]  Fax: [phone]    PT Visit Info:    Total # of Visits Approved: 17  Total # of Visits to Date: 17  Progress Note Counter: 4  Progress Note Due Date: 24     OT Visit Info:  No data recorded    OUTPATIENT THERAPY: 2024  Episode  Appt Desk        Mendel Ray McGill  therapist cancelled  his appointment for today due to  waiting on insurance authorization .  Will plan to follow up next during next appointment.  Thank you,  CHARAN AVENDAÑO, PT    Future Appointments   Date Time Provider Department Center   2024 10:30 AM Huy Matson MD [facility]   2024  1:00 PM Charan Avendaño, PT [facility]   2024  9:30 AM Charan Avendaño, PT [facility]   2024 10:15 AM Charan Avendaño, PT [facility]   2024  9:30 AM Charan Avendaño, PT [facility]   2024  9:30 AM Charan Avendaño, PT [facility]   2024  9:30 AM Charan Avendaño, PT [facility]

## 2024-04-16 NOTE — PROGRESS NOTES
Progress Note    Patient: Mendel Ray McGill MRN: [id number]  SSN: [id number]    YOB: 1960  Age: 63 y.o.  Sex: male        4/16/2024      Subjective:     Patient is here today in follow-up.  He is now about 3 months out from really 2 injuries he had a left proximal humerus fracture that was treated with intramedullary nail fixation by Dr. Friere also had a left distal radius fracture that was pretty much a nondisplaced intra-articular fracture of the radius as well as a small avulsion off of his ulnar styloid.  This was treated nonoperatively.  He says his shoulder really does not give him much trouble as far as pain the big thing is just try to get his strength and motion back.  He reports that in physical therapy whenever he really gets loosened up with physical therapy which he feels like is helping quite a bit he can do a little bit more but he still has a little bit of limitations as far as abduction and forward flexion with his left shoulder.  Very little pain in the shoulder    Objective:     There were no vitals filed for this visit.       Physical Exam:     Skin - incision is well healed with no redness or drainage  Motor and sensory function intact in left upper extremity  Pulses palpable in LEFT UPPER extremity   He can actively abduct and forward flex to about 100 degrees for me in the office today.  XRAY FINDINGS:  Yimtvohanez-qnpqpa-ra left proximal humerus fracture, findings-true AP and scapular Y views of the left shoulder shows the left proximal humerus fracture shows significant evidence of healing his primary fracture lines.  His overall alignment is satisfactory and in excellent position.  The hardware is intact with no evidence of loosening or failure.  Impression-healing left proximal humerus fracture    Dedhhryphzt-bkifns-kr left distal radius fracture, findings-AP and lateral views of the left wrist shows a left distal radius fracture has completely healed now.  He does have a

## 2024-04-18 ENCOUNTER — HOSPITAL ENCOUNTER (OUTPATIENT)
Dept: PHYSICAL THERAPY | Age: 64
Setting detail: RECURRING SERIES
Discharge: HOME | End: 2024-04-21
Attending: ORTHOPAEDIC SURGERY
Payer: COMMERCIAL

## 2024-04-18 PROCEDURE — 97110 THERAPEUTIC EXERCISES: CPT

## 2024-04-18 PROCEDURE — 97140 MANUAL THERAPY 1/> REGIONS: CPT

## 2024-04-18 ASSESSMENT — PAIN DESCRIPTION - ORIENTATION: ORIENTATION: LEFT

## 2024-04-18 ASSESSMENT — PAIN DESCRIPTION - LOCATION: LOCATION: SHOULDER

## 2024-04-18 ASSESSMENT — PAIN SCALES - GENERAL: PAINLEVEL_OUTOF10: 3

## 2024-04-18 NOTE — PROGRESS NOTES
Mendel Ray McGill  : 1960  Primary: Christopher Nash (Gisela ALCALA)  Secondary:  [facility] @ [address]  [place] [address]  Phone: [phone]  Fax: [phone] Plan Frequency: 2 times per week  Plan of Care/Certification Expiration Date: 05/15/24        Plan of Care/Certification Expiration Date:  Plan of Care/Certification Expiration Date: 05/15/24    Frequency/Duration: Plan Frequency: 2 times per week      Time In/Out:   Time In: 930  Time Out: 1010      PT Visit Info:    Total # of Visits Approved: 17  Progress Note Due Date: 24  Total # of Visits to Date: 18  Progress Note Counter: 5      Visit Count:  18    OUTPATIENT PHYSICAL THERAPY:   Treatment Note 2024       Episode  (PT: Left humeral fracture)               Treatment Diagnosis:    Acute pain of left shoulder  Pain in left wrist  Medical/Referring Diagnosis:    Closed fracture of shaft of left humerus, unspecified fracture morphology, initial encounter [S42.302A]  Left wrist pain [M25.532]  Referring Physician:  Huy Matson MD MD Orders:   Evaluate & Treat:  -Left Shoulder: Full Active/Passive ROM and Full Strengthening;   -Left Wrist: Full Active/Passive ROM and Full Strengthening.  Return MD Appt:  TBD  Date of Onset:  Onset Date: 24 (Surgery: 2024)  Allergies:   Shellfish allergy and Penicillins  Restrictions/Precautions:   No restrictions for L shoulder; strengthening restrictions for L wrist      Interventions Planned (Treatment may consist of any combination of the following):     See Assessment Note    Subjective Comments:   Patient reports he is doing ok and trying to stay active.    Initial Pain Level:: Left Shoulder 3/10 (at rest)  Post Session Pain Level:  Left  Shoulder 3/10 (at rest)  Medications Last Reviewed:  2024  Updated Objective Findings:  None Today  Treatment   THERAPEUTIC EXERCISE: (30 minutes):    Exercises per grid below to improve

## 2024-04-23 ENCOUNTER — HOSPITAL ENCOUNTER (OUTPATIENT)
Dept: PHYSICAL THERAPY | Age: 64
Setting detail: RECURRING SERIES
Discharge: HOME | End: 2024-04-26
Attending: ORTHOPAEDIC SURGERY
Payer: COMMERCIAL

## 2024-04-23 PROCEDURE — 97110 THERAPEUTIC EXERCISES: CPT

## 2024-04-23 PROCEDURE — 97140 MANUAL THERAPY 1/> REGIONS: CPT

## 2024-04-23 ASSESSMENT — PAIN SCALES - GENERAL: PAINLEVEL_OUTOF10: 2

## 2024-04-23 ASSESSMENT — PAIN DESCRIPTION - ORIENTATION: ORIENTATION: LEFT

## 2024-04-23 ASSESSMENT — PAIN DESCRIPTION - LOCATION: LOCATION: SHOULDER

## 2024-04-23 NOTE — PROGRESS NOTES
Today  Treatment   THERAPEUTIC EXERCISE: (30 minutes):    Exercises per grid below to improve mobility and strength.  Required minimal visual, verbal, and manual cues to promote proper body alignment, promote proper body posture, and promote proper body mechanics.  Progressed resistance, range, repetitions, and complexity of movement as indicated.   Date:  4/23/2024   Activity/Exercise Parameters   Melita's  15 reps to tolerance in flexion   Wall slides with elbows bent: short range engagement 15 reps to tolerance in flexion   UBE 6 minutes  Level 3   Nautilus rows 45 pounds  15 reps  2 sets   Nautilus chest press 45 pounds  15 reps  2 sets   Bicep curls 10 pounds  15 reps  B UE   Bent over rows 10 pounds  15 reps  L UE   Bent over extension 8 pounds  15 reps  L UE   Bent over Abduction 8 pounds  15 reps  L UE   Standing wrist prontation/supination 5 pounds  15 reps  B UE    Blue gripper  15 reps  L UE   Wall push-ups To tolerance  15 reps   Wand flexion To tolerance in standing  15 reps      MANUAL THERAPY: (10 minutes - L UE):   Joint mobilization and Soft tissue mobilization was utilized and necessary because of the patient's restricted joint motion and restricted motion of soft tissue.     Treatment/Session Summary:    Treatment Assessment:   Patient tolerated treatment well with improving overall mobility.  Communication/Consultation:  None today  Equipment provided today:  None  Recommendations/Intent for next treatment session: Next visit will focus on improving overall mobility, strength, and pain with daily activities.    >Total Treatment Billable Duration:  40 minutes  Time In: 1015  Time Out: 1055     BRITTANI AVENDAÑO PT         Charge Capture  [facility]  Appt Desk     Future Appointments   Date Time Provider Department Center   4/24/2024  9:30 AM Brittani Avendaño PT [facility]   4/30/2024  9:30 AM Brittani Avendaño PT [facility]   5/2/2024  9:30 AM Brittani Avendaño PT [facility]

## 2024-04-24 ENCOUNTER — HOSPITAL ENCOUNTER (OUTPATIENT)
Dept: PHYSICAL THERAPY | Age: 64
Setting detail: RECURRING SERIES
Discharge: HOME | End: 2024-04-27
Attending: ORTHOPAEDIC SURGERY
Payer: COMMERCIAL

## 2024-04-24 PROCEDURE — 97140 MANUAL THERAPY 1/> REGIONS: CPT

## 2024-04-24 PROCEDURE — 97110 THERAPEUTIC EXERCISES: CPT

## 2024-04-24 ASSESSMENT — PAIN DESCRIPTION - ORIENTATION: ORIENTATION: LEFT

## 2024-04-24 ASSESSMENT — PAIN DESCRIPTION - LOCATION: LOCATION: SHOULDER

## 2024-04-24 ASSESSMENT — PAIN SCALES - GENERAL: PAINLEVEL_OUTOF10: 2

## 2024-04-24 NOTE — PROGRESS NOTES
4/24/2024  Updated Objective Findings:  None Today  Treatment   THERAPEUTIC EXERCISE: (30 minutes):    Exercises per grid below to improve mobility and strength.  Required minimal visual, verbal, and manual cues to promote proper body alignment, promote proper body posture, and promote proper body mechanics.  Progressed resistance, range, repetitions, and complexity of movement as indicated.   Date:  4/24/2024   Activity/Exercise Parameters   Melita's  15 reps to tolerance in flexion   Wall slides with elbows bent: short range engagement 15 reps to tolerance in flexion   UBE 6 minutes  Level 3   Nautilus rows 45 pounds  15 reps  2 sets   Nautilus chest press 45 pounds  15 reps  2 sets   Bicep curls 10 pounds  15 reps  B UE   Bent over rows 10 pounds  15 reps  L UE   Bent over extension 8 pounds  15 reps  L UE   Bent over Abduction 8 pounds  15 reps  L UE   Standing wrist prontation/supination 5 pounds  15 reps  B UE    Blue gripper  15 reps  L UE   Wall push-ups To tolerance  15 reps   Wand flexion To tolerance in standing  15 reps      MANUAL THERAPY: (10 minutes - L UE):   Joint mobilization and Soft tissue mobilization was utilized and necessary because of the patient's restricted joint motion and restricted motion of soft tissue.     Treatment/Session Summary:    Treatment Assessment:   Patient tolerated treatment well with improving overall mobility.  Communication/Consultation:  None today  Equipment provided today:  None  Recommendations/Intent for next treatment session: Next visit will focus on improving overall mobility, strength, and pain with daily activities.    >Total Treatment Billable Duration:  40 minutes  Time In: 0930  Time Out: 1010     BRITTANI AVENDAÑO PT         Charge Capture  [employer] [facility]  Appt Desk     Future Appointments   Date Time Provider Department Center   4/30/2024  9:30 AM Brittani Avendaño PT [facility]   5/2/2024  9:30 AM Brittani Avendaño PT [facility]   5/7/2024  8:45

## 2024-04-30 ENCOUNTER — HOSPITAL ENCOUNTER (OUTPATIENT)
Dept: PHYSICAL THERAPY | Age: 64
Setting detail: RECURRING SERIES
Discharge: HOME | End: 2024-05-03
Attending: ORTHOPAEDIC SURGERY
Payer: COMMERCIAL

## 2024-04-30 PROCEDURE — 97110 THERAPEUTIC EXERCISES: CPT

## 2024-04-30 PROCEDURE — 97140 MANUAL THERAPY 1/> REGIONS: CPT

## 2024-04-30 ASSESSMENT — PAIN DESCRIPTION - ORIENTATION: ORIENTATION: LEFT

## 2024-04-30 ASSESSMENT — PAIN SCALES - GENERAL: PAINLEVEL_OUTOF10: 2

## 2024-04-30 ASSESSMENT — PAIN DESCRIPTION - LOCATION: LOCATION: SHOULDER

## 2024-04-30 NOTE — PROGRESS NOTES
Mendel Ray McGill  : 1960  Primary: Christopher Nash (Gisela ALCALA)  Secondary:  [facility] @ [address]  [place] [address]  Phone: [phone]  Fax: [phone] Plan Frequency: 2 times per week  Plan of Care/Certification Expiration Date: 05/15/24        Plan of Care/Certification Expiration Date:  Plan of Care/Certification Expiration Date: 05/15/24    Frequency/Duration: Plan Frequency: 2 times per week      Time In/Out:   Time In: 930  Time Out: 1010      PT Visit Info:    Total # of Visits Approved: 17  Progress Note Due Date: 24  Total # of Visits to Date: 21  Progress Note Counter: 8      Visit Count:  21    OUTPATIENT PHYSICAL THERAPY:   Treatment Note 2024       Episode  (PT: Left humeral fracture)               Treatment Diagnosis:    Acute pain of left shoulder  Pain in left wrist  Medical/Referring Diagnosis:    Closed fracture of shaft of left humerus, unspecified fracture morphology, initial encounter [S42.302A]  Left wrist pain [M25.532]  Referring Physician:  Huy Matson MD MD Orders:   Evaluate & Treat:  -Left Shoulder: Full Active/Passive ROM and Full Strengthening;   -Left Wrist: Full Active/Passive ROM and Full Strengthening.  Return MD Appt:  TBD  Date of Onset:  Onset Date: 24 (Surgery: 2024)  Allergies:   Shellfish allergy and Penicillins  Restrictions/Precautions:   No restrictions for L shoulder; strengthening restrictions for L wrist      Interventions Planned (Treatment may consist of any combination of the following):     See Assessment Note    Subjective Comments:   Patient reports he did a good bit of work this weekend so he is sore today.    Initial Pain Level:: Left Shoulder 2/10 (at rest)  Post Session Pain Level:  Left  Shoulder 2/10 (at rest)  Medications Last Reviewed:  2024  Updated Objective Findings:  None Today  Treatment   THERAPEUTIC EXERCISE: (30 minutes):    Exercises per grid below

## 2024-05-02 ENCOUNTER — HOSPITAL ENCOUNTER (OUTPATIENT)
Dept: PHYSICAL THERAPY | Age: 64
Setting detail: RECURRING SERIES
Discharge: HOME | End: 2024-05-05
Attending: ORTHOPAEDIC SURGERY
Payer: COMMERCIAL

## 2024-05-02 PROCEDURE — 97140 MANUAL THERAPY 1/> REGIONS: CPT

## 2024-05-02 PROCEDURE — 97110 THERAPEUTIC EXERCISES: CPT

## 2024-05-02 ASSESSMENT — PAIN DESCRIPTION - ORIENTATION: ORIENTATION: LEFT

## 2024-05-02 ASSESSMENT — PAIN SCALES - GENERAL: PAINLEVEL_OUTOF10: 2

## 2024-05-02 ASSESSMENT — PAIN DESCRIPTION - LOCATION: LOCATION: SHOULDER

## 2024-05-02 NOTE — PROGRESS NOTES
Mendel Ray McGill  : 1960  Primary: Christopher Nash (Gisela ALCALA)  Secondary:  [facility] @ [address]  [place] [address]  Phone: [phone]  Fax: [phone] Plan Frequency: 2 times per week  Plan of Care/Certification Expiration Date: 05/15/24        Plan of Care/Certification Expiration Date:  Plan of Care/Certification Expiration Date: 05/15/24    Frequency/Duration: Plan Frequency: 2 times per week      Time In/Out:   Time In: 930  Time Out: 1010      PT Visit Info:    Total # of Visits Approved: 17  Progress Note Due Date: 24  Total # of Visits to Date: 22  Progress Note Counter: 9      Visit Count:  22    OUTPATIENT PHYSICAL THERAPY:   Treatment Note 2024       Episode  (PT: Left humeral fracture)               Treatment Diagnosis:    Acute pain of left shoulder  Pain in left wrist  Medical/Referring Diagnosis:    Closed fracture of shaft of left humerus, unspecified fracture morphology, initial encounter [S42.302A]  Left wrist pain [M25.532]  Referring Physician:  Huy Matson MD MD Orders:   Evaluate & Treat:  -Left Shoulder: Full Active/Passive ROM and Full Strengthening;   -Left Wrist: Full Active/Passive ROM and Full Strengthening.  Return MD Appt:  TBD  Date of Onset:  Onset Date: 24 (Surgery: 2024)  Allergies:   Shellfish allergy and Penicillins  Restrictions/Precautions:   No restrictions for L shoulder; strengthening restrictions for L wrist      Interventions Planned (Treatment may consist of any combination of the following):     See Assessment Note    Subjective Comments:   Patient reports he is concerned about the increased fluid around his midsection and legs.  He reports his shoulder is doing fine.    Initial Pain Level:: Left Shoulder 2/10 (at rest)  Post Session Pain Level:  Left  Shoulder 2/10 (at rest)  Medications Last Reviewed:  2024  Updated Objective Findings:  None Today  Treatment   THERAPEUTIC

## 2024-05-07 ENCOUNTER — HOSPITAL ENCOUNTER (OUTPATIENT)
Dept: PHYSICAL THERAPY | Age: 64
Setting detail: RECURRING SERIES
Discharge: HOME | End: 2024-05-10
Attending: ORTHOPAEDIC SURGERY
Payer: COMMERCIAL

## 2024-05-07 PROCEDURE — 97140 MANUAL THERAPY 1/> REGIONS: CPT

## 2024-05-07 PROCEDURE — 97110 THERAPEUTIC EXERCISES: CPT

## 2024-05-07 ASSESSMENT — PAIN DESCRIPTION - ORIENTATION: ORIENTATION: LEFT

## 2024-05-07 ASSESSMENT — PAIN DESCRIPTION - LOCATION: LOCATION: SHOULDER

## 2024-05-07 ASSESSMENT — PAIN SCALES - GENERAL: PAINLEVEL_OUTOF10: 2

## 2024-05-07 NOTE — PROGRESS NOTES
Mendel Ray McGill  : 1960  Primary: Christopher Nash (Gisela ALCALA)  Secondary:  [facility] @ [address]  [place] [address]  Phone: [phone]  Fax: [phone] Plan Frequency: 2 times per week  Plan of Care/Certification Expiration Date: 05/15/24        Plan of Care/Certification Expiration Date:  Plan of Care/Certification Expiration Date: 05/15/24    Frequency/Duration: Plan Frequency: 2 times per week      Time In/Out:   Time In: 0845  Time Out: 925      PT Visit Info:    Total # of Visits Approved: 17  Progress Note Due Date: 24  Total # of Visits to Date: 23  Progress Note Counter: 10      Visit Count:  23    OUTPATIENT PHYSICAL THERAPY:   Treatment Note 2024       Episode  (PT: Left humeral fracture)               Treatment Diagnosis:    Acute pain of left shoulder  Pain in left wrist  Medical/Referring Diagnosis:    Closed fracture of shaft of left humerus, unspecified fracture morphology, initial encounter [S42.302A]  Left wrist pain [M25.532]  Referring Physician:  Huy Matson MD MD Orders:   Evaluate & Treat:  -Left Shoulder: Full Active/Passive ROM and Full Strengthening;   -Left Wrist: Full Active/Passive ROM and Full Strengthening.  Return MD Appt:  TBD  Date of Onset:  Onset Date: 24 (Surgery: 2024)  Allergies:   Shellfish allergy and Penicillins  Restrictions/Precautions:   No restrictions for L shoulder; strengthening restrictions for L wrist      Interventions Planned (Treatment may consist of any combination of the following):     See Assessment Note    Subjective Comments:   Patient reports he is doing ok.    Initial Pain Level:: Left Shoulder 2/10   Post Session Pain Level:  Left  Shoulder 2/10   Medications Last Reviewed:  2024  Updated Objective Findings:  None Today  Treatment   THERAPEUTIC EXERCISE: (30 minutes):    Exercises per grid below to improve mobility and strength.  Required minimal visual,

## 2024-05-09 ENCOUNTER — HOSPITAL ENCOUNTER (OUTPATIENT)
Dept: PHYSICAL THERAPY | Age: 64
Setting detail: RECURRING SERIES
Discharge: HOME | End: 2024-05-12
Attending: ORTHOPAEDIC SURGERY
Payer: COMMERCIAL

## 2024-05-09 PROCEDURE — 97140 MANUAL THERAPY 1/> REGIONS: CPT

## 2024-05-09 PROCEDURE — 97110 THERAPEUTIC EXERCISES: CPT

## 2024-05-09 ASSESSMENT — PAIN SCALES - GENERAL: PAINLEVEL_OUTOF10: 2

## 2024-05-09 ASSESSMENT — PAIN DESCRIPTION - ORIENTATION: ORIENTATION: LEFT

## 2024-05-09 ASSESSMENT — PAIN DESCRIPTION - LOCATION: LOCATION: SHOULDER

## 2024-05-14 ENCOUNTER — HOSPITAL ENCOUNTER (OUTPATIENT)
Dept: PHYSICAL THERAPY | Age: 64
Setting detail: RECURRING SERIES
Discharge: HOME | End: 2024-05-17
Attending: ORTHOPAEDIC SURGERY
Payer: COMMERCIAL

## 2024-05-14 PROCEDURE — 97110 THERAPEUTIC EXERCISES: CPT

## 2024-05-14 PROCEDURE — 97140 MANUAL THERAPY 1/> REGIONS: CPT

## 2024-05-14 ASSESSMENT — PAIN DESCRIPTION - ORIENTATION: ORIENTATION: LEFT

## 2024-05-14 ASSESSMENT — PAIN SCALES - GENERAL: PAINLEVEL_OUTOF10: 2

## 2024-05-14 ASSESSMENT — PAIN DESCRIPTION - LOCATION: LOCATION: SHOULDER

## 2024-05-14 NOTE — THERAPY RECERTIFICATION
Score: The DASH is designed to measure the activities of daily living in person's with upper extremity dysfunction or pain.  Each section is scored on a 1-5 scale, 5 representing the greatest disability.  The scores of each section are added together for a total score of 55.      Medical Necessity:   > Patient is expected to demonstrate progress in strength and range of motion to improve safety during daily activities.  > Patient demonstrates good rehab potential due to higher previous functional level.  > Skilled intervention continues to be required due to decreased functional mobility with daily activities.  Reason For Services/Other Comments:  > Patient continues to demonstrate capacity to improve overall functional mobility which will increase independence and increase safety.      Regarding Mendel Ray McGill's therapy, I certify that the treatment plan above will be carried out by a therapist or under their direction.  Thank you for this referral,  CHARAN AVENDAÑO PT     Referring Physician Signature: Huy Matson MD                    Charge Capture  Appt Desk

## 2024-05-14 NOTE — PROGRESS NOTES
Mendel Ray McGill  : 1960  Primary: Christopher Nash (Gisela ALCALA)  Secondary:  [facility] @ [address]  [place] [address]  Phone: [phone]  Fax: [phone] Plan Frequency: 2 times per week  Plan of Care/Certification Expiration Date: 24        Plan of Care/Certification Expiration Date:  Plan of Care/Certification Expiration Date: 24    Frequency/Duration: Plan Frequency: 2 times per week      Time In/Out:   Time In: 930  Time Out: 1010      PT Visit Info:    Total # of Visits Approved: 17  Progress Note Due Date: 24  Total # of Visits to Date: 25  Progress Note Counter: 1      Visit Count:  25    OUTPATIENT PHYSICAL THERAPY:   Treatment Note 2024       Episode  (PT: Left humeral fracture)               Treatment Diagnosis:    Acute pain of left shoulder  Pain in left wrist  Medical/Referring Diagnosis:    Closed fracture of shaft of left humerus, unspecified fracture morphology, initial encounter [S42.302A]  Left wrist pain [M25.532]  Referring Physician:  Huy Matson MD MD Orders:   Evaluate & Treat:  -Left Shoulder: Full Active/Passive ROM and Full Strengthening;   -Left Wrist: Full Active/Passive ROM and Full Strengthening.  Return MD Appt:  TBD  Date of Onset:  Onset Date: 24 (Surgery: 2024)  Allergies:   Shellfish allergy and Penicillins  Restrictions/Precautions:   No restrictions for L shoulder; strengthening restrictions for L wrist      Interventions Planned (Treatment may consist of any combination of the following):     See Assessment Note    Subjective Comments:   Patient reports that he and his son worked all day yesterday [employer].    Initial Pain Level:: Left Shoulder 2/10   Post Session Pain Level:  Left  Shoulder 2/10   Medications Last Reviewed:  2024  Updated Objective Findings:  None Today  Treatment   THERAPEUTIC EXERCISE: (30 minutes):    Exercises per grid below to improve

## 2024-05-16 ENCOUNTER — HOSPITAL ENCOUNTER (OUTPATIENT)
Dept: PHYSICAL THERAPY | Age: 64
Setting detail: RECURRING SERIES
Discharge: HOME | End: 2024-05-19
Attending: ORTHOPAEDIC SURGERY
Payer: COMMERCIAL

## 2024-05-16 PROCEDURE — 97140 MANUAL THERAPY 1/> REGIONS: CPT

## 2024-05-16 PROCEDURE — 97110 THERAPEUTIC EXERCISES: CPT

## 2024-05-16 ASSESSMENT — PAIN DESCRIPTION - ORIENTATION: ORIENTATION: LEFT

## 2024-05-16 ASSESSMENT — PAIN SCALES - GENERAL: PAINLEVEL_OUTOF10: 2

## 2024-05-16 ASSESSMENT — PAIN DESCRIPTION - LOCATION: LOCATION: SHOULDER

## 2024-05-16 NOTE — PROGRESS NOTES
Mendel Ray McGill  : 1960  Primary: Christopher Nash (Gisela ALCALA)  Secondary:  [facility] @ [address]  [place] [address]  Phone: [phone]  Fax: [phone] Plan Frequency: 2 times per week  Plan of Care/Certification Expiration Date: 24        Plan of Care/Certification Expiration Date:  Plan of Care/Certification Expiration Date: 24    Frequency/Duration: Plan Frequency: 2 times per week      Time In/Out:   Time In: 1600  Time Out: 1640      PT Visit Info:    Total # of Visits Approved: 17  Progress Note Due Date: 24  Total # of Visits to Date: 26  Progress Note Counter: 2      Visit Count:  26    OUTPATIENT PHYSICAL THERAPY:   Treatment Note 2024       Episode  (PT: Left humeral fracture)               Treatment Diagnosis:    Acute pain of left shoulder  Pain in left wrist  Medical/Referring Diagnosis:    Closed fracture of shaft of left humerus, unspecified fracture morphology, initial encounter [S42.302A]  Left wrist pain [M25.532]  Referring Physician:  Huy Matson MD MD Orders:   Evaluate & Treat:  -Left Shoulder: Full Active/Passive ROM and Full Strengthening;   -Left Wrist: Full Active/Passive ROM and Full Strengthening.  Return MD Appt:  TBD  Date of Onset:  Onset Date: 24 (Surgery: 2024)  Allergies:   Shellfish allergy and Penicillins  Restrictions/Precautions:   No restrictions for L shoulder; strengthening restrictions for L wrist      Interventions Planned (Treatment may consist of any combination of the following):     See Assessment Note    Subjective Comments:   Patient reports that he has been to several doctors appointments today to try and figure out the swelling in his legs and abdomen.  He reports his shoulder feels stiff today.    Initial Pain Level:: Left Shoulder 2/10   Post Session Pain Level:  Left  Shoulder 2/10   Medications Last Reviewed:  2024  Updated Objective Findings:  None

## 2024-05-21 ENCOUNTER — HOSPITAL ENCOUNTER (OUTPATIENT)
Dept: PHYSICAL THERAPY | Age: 64
Setting detail: RECURRING SERIES
Discharge: HOME | End: 2024-05-24
Attending: ORTHOPAEDIC SURGERY
Payer: COMMERCIAL

## 2024-05-21 PROCEDURE — 97140 MANUAL THERAPY 1/> REGIONS: CPT

## 2024-05-21 PROCEDURE — 97110 THERAPEUTIC EXERCISES: CPT

## 2024-05-21 ASSESSMENT — PAIN DESCRIPTION - LOCATION: LOCATION: SHOULDER

## 2024-05-21 ASSESSMENT — PAIN SCALES - GENERAL: PAINLEVEL_OUTOF10: 2

## 2024-05-21 ASSESSMENT — PAIN DESCRIPTION - ORIENTATION: ORIENTATION: LEFT

## 2024-05-21 NOTE — PROGRESS NOTES
Huy DAMON MD BS[facility]DT [facility]   5/30/2024  9:30 AM Brittani Barba, PT [facility]

## 2024-05-23 ENCOUNTER — HOSPITAL ENCOUNTER (OUTPATIENT)
Dept: PHYSICAL THERAPY | Age: 64
Setting detail: RECURRING SERIES
Discharge: HOME | End: 2024-05-26
Attending: ORTHOPAEDIC SURGERY
Payer: COMMERCIAL

## 2024-05-23 PROCEDURE — 97110 THERAPEUTIC EXERCISES: CPT

## 2024-05-23 PROCEDURE — 97140 MANUAL THERAPY 1/> REGIONS: CPT

## 2024-05-23 ASSESSMENT — PAIN DESCRIPTION - LOCATION: LOCATION: SHOULDER

## 2024-05-23 ASSESSMENT — PAIN DESCRIPTION - ORIENTATION: ORIENTATION: LEFT

## 2024-05-23 ASSESSMENT — PAIN SCALES - GENERAL: PAINLEVEL_OUTOF10: 2

## 2024-05-23 NOTE — PROGRESS NOTES
Mendel Ray McGill  : 1960  Primary: Christopher Nash (Gisela ALCALA)  Secondary:  [facility] @ [address]  [place] [address]  Phone: [phone]  Fax: [phone] Plan Frequency: 2 times per week  Plan of Care/Certification Expiration Date: 24        Plan of Care/Certification Expiration Date:  Plan of Care/Certification Expiration Date: 24    Frequency/Duration: Plan Frequency: 2 times per week      Time In/Out:   Time In: 930  Time Out: 1010      PT Visit Info:    Total # of Visits Approved: 17  Progress Note Due Date: 24  Total # of Visits to Date: 28  Progress Note Counter: 4      Visit Count:  28    OUTPATIENT PHYSICAL THERAPY:   Treatment Note 2024       Episode  (PT: Left humeral fracture)               Treatment Diagnosis:    Acute pain of left shoulder  Pain in left wrist  Medical/Referring Diagnosis:    Closed fracture of shaft of left humerus, unspecified fracture morphology, initial encounter [S42.302A]  Left wrist pain [M25.532]  Referring Physician:  Huy Matson MD MD Orders:   Evaluate & Treat:  -Left Shoulder: Full Active/Passive ROM and Full Strengthening;   -Left Wrist: Full Active/Passive ROM and Full Strengthening.  Return MD Appt:  TBD  Date of Onset:  Onset Date: 24 (Surgery: 2024)  Allergies:   Shellfish allergy and Penicillins  Restrictions/Precautions:   No restrictions for L shoulder; strengthening restrictions for L wrist      Interventions Planned (Treatment may consist of any combination of the following):     See Assessment Note    Subjective Comments:   Patient reports he is doing ok.    Initial Pain Level:: Left Shoulder 2/10   Post Session Pain Level:  Left  Shoulder 2/10   Medications Last Reviewed:  2024  Updated Objective Findings:  None Today  Treatment   THERAPEUTIC EXERCISE: (30 minutes):    Exercises per grid below to improve mobility and strength.  Required minimal visual,

## 2024-05-28 ENCOUNTER — HOSPITAL ENCOUNTER (OUTPATIENT)
Dept: PHYSICAL THERAPY | Age: 64
Setting detail: RECURRING SERIES
Discharge: HOME | End: 2024-05-31
Attending: ORTHOPAEDIC SURGERY
Payer: COMMERCIAL

## 2024-05-28 ENCOUNTER — OFFICE VISIT (OUTPATIENT)
Dept: ORTHOPEDIC SURGERY | Age: 64
End: 2024-05-28
Payer: COMMERCIAL

## 2024-05-28 DIAGNOSIS — S42.302D CLOSED FRACTURE OF SHAFT OF LEFT HUMERUS WITH ROUTINE HEALING, UNSPECIFIED FRACTURE MORPHOLOGY, SUBSEQUENT ENCOUNTER: Primary | ICD-10-CM

## 2024-05-28 PROCEDURE — 97110 THERAPEUTIC EXERCISES: CPT

## 2024-05-28 PROCEDURE — 99212 OFFICE O/P EST SF 10 MIN: CPT | Performed by: ORTHOPAEDIC SURGERY

## 2024-05-28 PROCEDURE — 97140 MANUAL THERAPY 1/> REGIONS: CPT

## 2024-05-28 ASSESSMENT — PAIN DESCRIPTION - ORIENTATION: ORIENTATION: LEFT

## 2024-05-28 ASSESSMENT — PAIN SCALES - GENERAL: PAINLEVEL_OUTOF10: 2

## 2024-05-28 ASSESSMENT — PAIN DESCRIPTION - LOCATION: LOCATION: SHOULDER

## 2024-05-28 NOTE — PROGRESS NOTES
Progress Note    Patient: Mendel Ray McGill MRN: [id number]  SSN: [id number]    YOB: 1960  Age: 63 y.o.  Sex: male        5/28/2024      Subjective:     Patient is here today in follow-up for 2 injuries to the left distal radius fracture treated nonoperatively and a left humerus fracture treated with intramedullary nail fixation.  He is about 4 and half months out.  He is really doing remarkably well with his shoulder and his wrist.  The things that are really bothering him are some other things such as the fact that he thinks he has sort of a pulled muscle in the area of his rib cage on the left side.  No history of falling or anything.  The other thing he has been putting up with his he has had some pretty significant workup recently to figure out why he has so much ascites in his lower abdomen area.  He does take some medications that he says they are holding all of his rheumatoid medications while they check out his liver and see exactly what is going on with this and why he is having so much fluid buildup.    Objective:     There were no vitals filed for this visit.       Physical Exam:     Skin - incision is well healed with no redness or drainage  Motor and sensory function intact in LEFT UPPER extremity  Pulses palpable in LEFT UPPER extremity     XRAY FINDINGS:  [id number] left humerus fracture, findings-true AP and scapular Y views of the left humerus shows the humerus fracture has healed healed in excellent position.  The hardware is intact with no evidence of loosening or failure.  The overall alignment is near anatomic.  Impression-healing left proximal humerus fracture        Assessment:     Healed left humerus fracture    Plan:     So I think with his humerus he does not need any further follow-up.  I think this is healed I think he can essentially be activity as tolerated.  He is in the middle of getting a workup for why he is having so much issues with fluid backing up

## 2024-05-28 NOTE — PROGRESS NOTES
Mendel Ray McGill  : 1960  Primary: Christopher Nash (Gisela ALCALA)  Secondary:  [facility] [place] @ [address]  [place] [address]  Phone: [phone]  Fax: [phone] Plan Frequency: 2 times per week  Plan of Care/Certification Expiration Date: 24        Plan of Care/Certification Expiration Date:  Plan of Care/Certification Expiration Date: 24    Frequency/Duration: Plan Frequency: 2 times per week      Time In/Out:   Time In: 0850  Time Out: 0930      PT Visit Info:    Total # of Visits Approved: 17  Progress Note Due Date: 24  Total # of Visits to Date: 29  Progress Note Counter: 5      Visit Count:  29    OUTPATIENT PHYSICAL THERAPY:   Treatment Note 2024       Episode  (PT: Left humeral fracture)               Treatment Diagnosis:    Acute pain of left shoulder  Pain in left wrist  Medical/Referring Diagnosis:    Closed fracture of shaft of left humerus, unspecified fracture morphology, initial encounter [S42.302A]  Left wrist pain [M25.532]  Referring Physician:  Huy Matson MD MD Orders:   Evaluate & Treat:  -Left Shoulder: Full Active/Passive ROM and Full Strengthening;   -Left Wrist: Full Active/Passive ROM and Full Strengthening.  Return MD Appt:  TBD  Date of Onset:  Onset Date: 24 (Surgery: 2024)  Allergies:   Shellfish allergy and Penicillins  Restrictions/Precautions:   No restrictions for L shoulder; strengthening restrictions for L wrist      Interventions Planned (Treatment may consist of any combination of the following):     See Assessment Note    Subjective Comments:   Patient reports that his left ribs are hurting today.  He reports he's not sure what happened, but he has a bruise there.  He reports that they drained 5 L of fluid off his abdomen.  He reports that his shoulder is doing ok, but still has pain and some limited range of motion.    Initial Pain Level:: Left Shoulder 2/10   Post Session Pain

## 2024-05-30 ENCOUNTER — HOSPITAL ENCOUNTER (OUTPATIENT)
Dept: PHYSICAL THERAPY | Age: 64
Setting detail: RECURRING SERIES
End: 2024-05-30
Attending: ORTHOPAEDIC SURGERY
Payer: COMMERCIAL

## 2024-05-30 PROCEDURE — 97110 THERAPEUTIC EXERCISES: CPT

## 2024-05-30 PROCEDURE — 97140 MANUAL THERAPY 1/> REGIONS: CPT

## 2024-05-30 ASSESSMENT — PAIN SCALES - GENERAL: PAINLEVEL_OUTOF10: 2

## 2024-05-30 ASSESSMENT — PAIN DESCRIPTION - ORIENTATION: ORIENTATION: LEFT

## 2024-05-30 ASSESSMENT — PAIN DESCRIPTION - LOCATION: LOCATION: SHOULDER

## 2024-05-30 NOTE — PROGRESS NOTES
Mendel Ray McGill  : 1960  Primary: Christopher Nash (Gisela ALCALA)  Secondary:  [facility] @ [address]  [place] [address]  Phone: [phone]  Fax: [phone] Plan Frequency: 2 times per week  Plan of Care/Certification Expiration Date: 24        Plan of Care/Certification Expiration Date:  Plan of Care/Certification Expiration Date: 24    Frequency/Duration: Plan Frequency: 2 times per week      Time In/Out:   Time In: 930  Time Out: 1010      PT Visit Info:    Total # of Visits Approved: 17  Progress Note Due Date: 24  Total # of Visits to Date: 30  Progress Note Counter: 6      Visit Count:  30    OUTPATIENT PHYSICAL THERAPY:   Treatment Note 2024       Episode  (PT: Left humeral fracture)               Treatment Diagnosis:    Acute pain of left shoulder  Pain in left wrist  Medical/Referring Diagnosis:    Closed fracture of shaft of left humerus, unspecified fracture morphology, initial encounter [S42.302A]  Left wrist pain [M25.532]  Referring Physician:  Huy Matsno MD MD Orders:   Evaluate & Treat:  -Left Shoulder: Full Active/Passive ROM and Full Strengthening;   -Left Wrist: Full Active/Passive ROM and Full Strengthening.  Return MD Appt:  TBD  Date of Onset:  Onset Date: 24 (Surgery: 2024)  Allergies:   Shellfish allergy and Penicillins  Restrictions/Precautions:   No restrictions for L shoulder; strengthening restrictions for L wrist      Interventions Planned (Treatment may consist of any combination of the following):     See Assessment Note    Subjective Comments:   Patient reports the surgeon has released him from his services, but he feels he needs to continue with therapy for a while since he can't fully straighten his arm.    Initial Pain Level:: Left Shoulder 2/10   Post Session Pain Level:  Left  Shoulder 2/10   Medications Last Reviewed:  2024  Updated Objective Findings:  None

## 2024-06-05 ENCOUNTER — HOSPITAL ENCOUNTER (OUTPATIENT)
Dept: PHYSICAL THERAPY | Age: 64
Setting detail: RECURRING SERIES
Discharge: HOME | End: 2024-06-08
Attending: ORTHOPAEDIC SURGERY
Payer: COMMERCIAL

## 2024-06-05 PROCEDURE — 97140 MANUAL THERAPY 1/> REGIONS: CPT

## 2024-06-05 PROCEDURE — 97110 THERAPEUTIC EXERCISES: CPT

## 2024-06-05 ASSESSMENT — PAIN DESCRIPTION - LOCATION: LOCATION: SHOULDER

## 2024-06-05 ASSESSMENT — PAIN DESCRIPTION - ORIENTATION: ORIENTATION: LEFT

## 2024-06-05 ASSESSMENT — PAIN SCALES - GENERAL: PAINLEVEL_OUTOF10: 2

## 2024-06-05 NOTE — PROGRESS NOTES
Mendel Ray McGill  : 1960  Primary: Christopher Nash (Gisela ALCALA)  Secondary:  [facility] [place] @ [address]  [place] [address]  Phone: [phone]  Fax: [phone] Plan Frequency: 2 times per week  Plan of Care/Certification Expiration Date: 24        Plan of Care/Certification Expiration Date:  Plan of Care/Certification Expiration Date: 24    Frequency/Duration: Plan Frequency: 2 times per week      Time In/Out:   Time In: 0850  Time Out: 0930      PT Visit Info:    Total # of Visits Approved: 17  Progress Note Due Date: 24  Total # of Visits to Date: 31  Progress Note Counter: 7      Visit Count:  31    OUTPATIENT PHYSICAL THERAPY:   Treatment Note 2024       Episode  (PT: Left humeral fracture)               Treatment Diagnosis:    Acute pain of left shoulder  Pain in left wrist  Medical/Referring Diagnosis:    Closed fracture of shaft of left humerus, unspecified fracture morphology, initial encounter [S42.302A]  Left wrist pain [M25.532]  Referring Physician:  Huy Matson MD MD Orders:   Evaluate & Treat:  -Left Shoulder: Full Active/Passive ROM and Full Strengthening;   -Left Wrist: Full Active/Passive ROM and Full Strengthening.  Return MD Appt:  TBD  Date of Onset:  Onset Date: 24 (Surgery: 2024)  Allergies:   Shellfish allergy and Penicillins  Restrictions/Precautions:   No restrictions for L shoulder; strengthening restrictions for L wrist      Interventions Planned (Treatment may consist of any combination of the following):     See Assessment Note    Subjective Comments:   Patient reports he jammed his right wrist yesterday, so it's more sore today.    Initial Pain Level:: Left Shoulder 2/10   Post Session Pain Level:  Left  Shoulder 2/10   Medications Last Reviewed:  2024  Updated Objective Findings:  None Today  Treatment   THERAPEUTIC EXERCISE: (30 minutes):    Exercises per grid below to improve

## 2024-06-06 ENCOUNTER — HOSPITAL ENCOUNTER (OUTPATIENT)
Dept: PHYSICAL THERAPY | Age: 64
Setting detail: RECURRING SERIES
Discharge: HOME | End: 2024-06-09
Attending: ORTHOPAEDIC SURGERY
Payer: COMMERCIAL

## 2024-06-06 PROCEDURE — 97140 MANUAL THERAPY 1/> REGIONS: CPT

## 2024-06-06 PROCEDURE — 97110 THERAPEUTIC EXERCISES: CPT

## 2024-06-06 ASSESSMENT — PAIN DESCRIPTION - ORIENTATION: ORIENTATION: LEFT

## 2024-06-06 ASSESSMENT — PAIN SCALES - GENERAL: PAINLEVEL_OUTOF10: 2

## 2024-06-06 ASSESSMENT — PAIN DESCRIPTION - LOCATION: LOCATION: SHOULDER

## 2024-06-06 NOTE — PROGRESS NOTES
Mendel Ray McGill  : 1960  Primary: Christopher Nash (Gisela ALCALA)  Secondary:  [facility] @ [address]  [place] [address]  Phone: [phone]  Fax: [phone] Plan Frequency: 2 times per week  Plan of Care/Certification Expiration Date: 24        Plan of Care/Certification Expiration Date:  Plan of Care/Certification Expiration Date: 24    Frequency/Duration: Plan Frequency: 2 times per week      Time In/Out:   Time In: 845  Time Out: 925      PT Visit Info:    Total # of Visits Approved: 17  Progress Note Due Date: 24  Total # of Visits to Date: 32  Progress Note Counter: 8      Visit Count:  32    OUTPATIENT PHYSICAL THERAPY:   Treatment Note 2024       Episode  (PT: Left humeral fracture)               Treatment Diagnosis:    Acute pain of left shoulder  Pain in left wrist  Medical/Referring Diagnosis:    Closed fracture of shaft of left humerus, unspecified fracture morphology, initial encounter [S42.302A]  Left wrist pain [M25.532]  Referring Physician:  Huy Matson MD MD Orders:   Evaluate & Treat:  -Left Shoulder: Full Active/Passive ROM and Full Strengthening;   -Left Wrist: Full Active/Passive ROM and Full Strengthening.  Return MD Appt:  TBD  Date of Onset:  Onset Date: 24 (Surgery: 2024)  Allergies:   Shellfish allergy and Penicillins  Restrictions/Precautions:   No restrictions for L shoulder; strengthening restrictions for L wrist      Interventions Planned (Treatment may consist of any combination of the following):     See Assessment Note    Subjective Comments:   Patient reports he is sore today.    Initial Pain Level:: Left Shoulder 2/10   Post Session Pain Level:  Left  Shoulder 2/10   Medications Last Reviewed:  2024  Updated Objective Findings:  None Today  Treatment   THERAPEUTIC EXERCISE: (30 minutes):    Exercises per grid below to improve mobility and strength.  Required minimal visual,

## 2024-06-11 ENCOUNTER — HOSPITAL ENCOUNTER (OUTPATIENT)
Dept: PHYSICAL THERAPY | Age: 64
Setting detail: RECURRING SERIES
Discharge: HOME | End: 2024-06-14
Attending: ORTHOPAEDIC SURGERY
Payer: COMMERCIAL

## 2024-06-11 PROCEDURE — 97140 MANUAL THERAPY 1/> REGIONS: CPT

## 2024-06-11 PROCEDURE — 97110 THERAPEUTIC EXERCISES: CPT

## 2024-06-11 ASSESSMENT — PAIN DESCRIPTION - LOCATION: LOCATION: SHOULDER

## 2024-06-11 ASSESSMENT — PAIN SCALES - GENERAL: PAINLEVEL_OUTOF10: 2

## 2024-06-11 ASSESSMENT — PAIN DESCRIPTION - ORIENTATION: ORIENTATION: LEFT

## 2024-06-11 NOTE — PROGRESS NOTES
Mendel Ray McGill  : 1960  Primary: Christopher Nash (Gisela ALCALA)  Secondary:  [facility] @ [address]  [place] [address]  Phone: [phone]  Fax: [phone] Plan Frequency: 2 times per week  Plan of Care/Certification Expiration Date: 24        Plan of Care/Certification Expiration Date:  Plan of Care/Certification Expiration Date: 24    Frequency/Duration: Plan Frequency: 2 times per week      Time In/Out:   Time In: 0800  Time Out: 0840      PT Visit Info:    Total # of Visits Approved: 17  Progress Note Due Date: 24  Total # of Visits to Date: 33  Progress Note Counter: 9      Visit Count:  33    OUTPATIENT PHYSICAL THERAPY:   Treatment Note 2024       Episode  (PT: Left humeral fracture)               Treatment Diagnosis:    Acute pain of left shoulder  Pain in left wrist  Medical/Referring Diagnosis:    Closed fracture of shaft of left humerus, unspecified fracture morphology, initial encounter [S42.302A]  Left wrist pain [M25.532]  Referring Physician:  Huy Matson MD MD Orders:   Evaluate & Treat:  -Left Shoulder: Full Active/Passive ROM and Full Strengthening;   -Left Wrist: Full Active/Passive ROM and Full Strengthening.  Return MD Appt:  TBD  Date of Onset:  Onset Date: 24 (Surgery: 2024)  Allergies:   Shellfish allergy and Penicillins  Restrictions/Precautions:   No restrictions for L shoulder; strengthening restrictions for L wrist      Interventions Planned (Treatment may consist of any combination of the following):     See Assessment Note    Subjective Comments:   Patient reports he is doing ok.    Initial Pain Level:: Left Shoulder 2/10   Post Session Pain Level:  Left  Shoulder 2/10   Medications Last Reviewed:  2024  Updated Objective Findings:  None Today  Treatment   THERAPEUTIC EXERCISE: (30 minutes):    Exercises per grid below to improve mobility and strength.  Required minimal visual,

## 2024-06-19 ENCOUNTER — PATIENT MESSAGE (OUTPATIENT)
Dept: ORTHOPEDIC SURGERY | Age: 64
End: 2024-06-19

## 2024-06-19 NOTE — TELEPHONE ENCOUNTER
----- Message from Mendel R. McGill sent at 6/19/2024  9:12 AM EDT -----  Regarding: Poping in the left shoulder   Contact: [phone]  Dr. Matson,  Over the last week and a haft I have developed a \"poping\" in my left shoulder that is preventing extended motion. Working with my therapist there is a lump or something rubbing against bone or other muscles. Is there a way to find out what is causing this?    Tomorrow morning  I have a session with my therapist and I'm going to request they provide you more medical/therapy detailed information.    Request that you or your nurse confirm that you have received this message. Thanks!    Mendel McGill ([phone])

## 2024-06-19 NOTE — TELEPHONE ENCOUNTER
Returned call to Pt and he stated  he is feeling a \"popping\" in his upper arm w/PT and there is now a lump as well. Pt was given an appt for eval 6/20/2024 w/ Dr. Matson. LH

## 2024-06-20 ENCOUNTER — HOSPITAL ENCOUNTER (OUTPATIENT)
Dept: PHYSICAL THERAPY | Age: 64
Setting detail: RECURRING SERIES
Discharge: HOME | End: 2024-06-23
Attending: ORTHOPAEDIC SURGERY
Payer: COMMERCIAL

## 2024-06-20 ENCOUNTER — OFFICE VISIT (OUTPATIENT)
Dept: ORTHOPEDIC SURGERY | Age: 64
End: 2024-06-20
Payer: COMMERCIAL

## 2024-06-20 DIAGNOSIS — S42.302A CLOSED FRACTURE OF SHAFT OF LEFT HUMERUS, UNSPECIFIED FRACTURE MORPHOLOGY, INITIAL ENCOUNTER: Primary | ICD-10-CM

## 2024-06-20 PROCEDURE — 97140 MANUAL THERAPY 1/> REGIONS: CPT

## 2024-06-20 PROCEDURE — 99212 OFFICE O/P EST SF 10 MIN: CPT | Performed by: ORTHOPAEDIC SURGERY

## 2024-06-20 PROCEDURE — 97110 THERAPEUTIC EXERCISES: CPT

## 2024-06-20 ASSESSMENT — PAIN DESCRIPTION - ORIENTATION: ORIENTATION: LEFT

## 2024-06-20 ASSESSMENT — PAIN SCALES - GENERAL: PAINLEVEL_OUTOF10: 2

## 2024-06-20 ASSESSMENT — PAIN DESCRIPTION - LOCATION: LOCATION: SHOULDER

## 2024-06-20 NOTE — PROGRESS NOTES
Progress Note    Patient: Mendel Ray McGill MRN: [id number]  SSN: [id number]    YOB: 1960  Age: 63 y.o.  Sex: male        6/20/2024      Subjective:     Patient is here today now about 4 months plus out from intramedullary nail fixation of his left shoulder proximal humerus.  He and his therapist want to be seen again because he is having some popping in his low shoulder.  Not real pain just popping he thinks is hurting his range of motion    Objective:     There were no vitals filed for this visit.       Physical Exam:     Skin - incision is well healed with no redness or drainage  Motor and sensory function intact in LEFT UPPER extremity  Pulses palpable in LEFT UPPER extremity     XRAY FINDINGS:  [id number] left proximal humerus fracture, findings-true AP and lateral views of the left humerus and shoulder area shows a healed left proximal humerus fracture with no evidence of any hardware failure or shift in his alignment.  This appears to be healed in good position.  His hardware as far as his alfredo proximally is not prominent.  He does have some of the locking screws proximal and which may be just a little bit proud but very appropriately positioned.  Impression # healed left proximal humerus fracture    Assessment:     Healed left proximal humerus fracture    Plan:     I think he likely does have some hardware that he probably is having some of the soft tissues in his left shoulder sort of catch and pop on his he does range of motion exercises.  I talked him about the fact that we could try hardware removal whenever this is solidly healed whenever he is healthy enough to do this in the future but he really would not be interested in that at this point so I think it would be reasonable just to continue to watch this he can be activity as tolerated and if he has enough discomfort with the popping and catching that he wants me to take the hardware out to be happy to do so otherwise he can

## 2024-06-20 NOTE — PROGRESS NOTES
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  He reports he goes back to the surgeon today for a follow up.  He reports he is hoping that more diagnostic tests might be done to look at his shoulder.     Initial Pain Level:: Left Shoulder 2/10   Post Session Pain Level:  Left  Shoulder 2/10   Medications Last Reviewed:  6/20/2024  Updated Objective Findings:  None Today  Treatment   THERAPEUTIC EXERCISE: (30 minutes):    Exercises per grid below to improve mobility and strength.  Required minimal visual, verbal, and manual cues to promote proper body alignment, promote proper body posture, and promote proper body mechanics.  Progressed resistance, range, repetitions, and complexity of movement as indicated.   Date:  6/20/2024   Activity/Exercise Parameters   UBE 6 minutes  Level 4   Nautilus rows 50 pounds  15 reps  2 sets   Nautilus chest press 45 pounds  15 reps  2 sets   Bicep curls 10 pounds  15 reps  B UE   Bent over rows 10 pounds  15 reps  L UE   Bent over extension 8 pounds  15 reps  L UE   Bent over Tricep Extension 5 pounds  15 reps  L UE   Side-lying abduction 3 pounds  15 reps  L UE   Side-lying horizontal abduction 15 reps  L UE      MANUAL THERAPY: (10 minutes - L UE):   Joint mobilization and Soft tissue mobilization was utilized and necessary because of the patient's restricted joint motion and restricted motion of soft tissue. (Upper trap, lower trap, rhomboids, first rib, pectoralis major, and pectoralis minor.)    Treatment/Session Summary:    Treatment Assessment:   Patient tolerated treatment well, but continues to have end range limited motion.  He also has an audible click into flexion and external rotation.   Communication/Consultation:  None today  Equipment provided today:  None  Recommendations/Intent for next treatment session: Next visit will focus on improving overall mobility, strength, and pain with daily activities.    >Total Treatment Billable Duration:  40 minutes  Time In: 
  Interpretation of Score: The DASH is designed to measure the activities of daily living in person's with upper extremity dysfunction or pain.  Each section is scored on a 1-5 scale, 5 representing the greatest disability.  The scores of each section are added together for a total score of 55.      Medical Necessity:   > Patient is expected to demonstrate progress in strength and range of motion to improve safety during daily activities.  > Patient demonstrates good rehab potential due to higher previous functional level.  > Skilled intervention continues to be required due to decreased functional mobility with daily activities.  Reason For Services/Other Comments:  > Patient continues to demonstrate capacity to improve overall functional mobility which will increase independence and increase safety.      Regarding Mendel Ray McGill's therapy, I certify that the treatment plan above will be carried out by a therapist or under their direction.  Thank you for this referral,  CHARAN AVENDAÑO PT     Referring Physician Signature: Huy Matson MD No Signature is Required for this note.        Charge Capture  Appt Desk

## 2024-06-25 ENCOUNTER — TELEPHONE (OUTPATIENT)
Dept: ORTHOPEDIC SURGERY | Age: 64
End: 2024-06-25

## 2024-06-25 NOTE — TELEPHONE ENCOUNTER
Urgent referral from Dr. Gallegos at [facility] for Closed fracture of right wrist. Xray in chart. Please advise

## 2024-06-26 ENCOUNTER — HOSPITAL ENCOUNTER (OUTPATIENT)
Dept: PHYSICAL THERAPY | Age: 64
Setting detail: RECURRING SERIES
Discharge: HOME | End: 2024-06-29
Attending: ORTHOPAEDIC SURGERY
Payer: COMMERCIAL

## 2024-06-26 ENCOUNTER — OFFICE VISIT (OUTPATIENT)
Age: 64
End: 2024-06-26
Payer: COMMERCIAL

## 2024-06-26 DIAGNOSIS — M05.742 RHEUMATOID ARTHRITIS INVOLVING BOTH HANDS WITH POSITIVE RHEUMATOID FACTOR (HCC): ICD-10-CM

## 2024-06-26 DIAGNOSIS — M05.741 RHEUMATOID ARTHRITIS INVOLVING BOTH HANDS WITH POSITIVE RHEUMATOID FACTOR (HCC): ICD-10-CM

## 2024-06-26 DIAGNOSIS — M65.342 TRIGGER FINGER, LEFT RING FINGER: ICD-10-CM

## 2024-06-26 DIAGNOSIS — S62.101A CLOSED FRACTURE OF RIGHT WRIST, INITIAL ENCOUNTER: Primary | ICD-10-CM

## 2024-06-26 DIAGNOSIS — M65.341 TRIGGER FINGER, RIGHT RING FINGER: ICD-10-CM

## 2024-06-26 PROCEDURE — 97140 MANUAL THERAPY 1/> REGIONS: CPT

## 2024-06-26 PROCEDURE — 97110 THERAPEUTIC EXERCISES: CPT

## 2024-06-26 PROCEDURE — 99214 OFFICE O/P EST MOD 30 MIN: CPT | Performed by: ORTHOPAEDIC SURGERY

## 2024-06-26 ASSESSMENT — PAIN DESCRIPTION - ORIENTATION: ORIENTATION: LEFT

## 2024-06-26 ASSESSMENT — PAIN SCALES - GENERAL: PAINLEVEL_OUTOF10: 2

## 2024-06-26 ASSESSMENT — PAIN DESCRIPTION - LOCATION: LOCATION: SHOULDER

## 2024-06-26 NOTE — TELEPHONE ENCOUNTER
Scheduled with Dr. Ac, Saw that he was recently seen by him this year. Pt was okay with it. Thank you

## 2024-06-26 NOTE — PROGRESS NOTES
Mendel Ray McGill  : 1960  Primary: Christopher Nash (Gisela ALCALA)  Secondary:  [facility] @ [address]  [place] [address]  Phone: [phone]  Fax: [phone] Plan Frequency: 2 times per week  Plan of Care/Certification Expiration Date: 24        Plan of Care/Certification Expiration Date:  Plan of Care/Certification Expiration Date: 24    Frequency/Duration: Plan Frequency: 2 times per week     Time In/Out:   Time In: 1645  Time Out: 1725      PT Visit Info:    Total # of Visits Approved: 17  Progress Note Due Date: 24  Total # of Visits to Date: 35  Progress Note Counter: 2      Visit Count:  35    OUTPATIENT PHYSICAL THERAPY:   Treatment Note 2024       Episode  (PT: Left humeral fracture)               Treatment Diagnosis:    Acute pain of left shoulder  Pain in left wrist  Medical/Referring Diagnosis:    Closed fracture of shaft of left humerus, unspecified fracture morphology, initial encounter [S42.302A]  Left wrist pain [M25.532]  Referring Physician:  Huy Matson MD MD Orders:   Evaluate & Treat:  -Left Shoulder: Full Active/Passive ROM and Full Strengthening;   -Left Wrist: Full Active/Passive ROM and Full Strengthening.  Return MD Appt:  TBD  Date of Onset:  Onset Date: 24 (Surgery: 2024)  Allergies:   Shellfish allergy and Penicillins  Restrictions/Precautions:   No restrictions for L shoulder; strengthening restrictions for L wrist      Interventions Planned (Treatment may consist of any combination of the following):     See Assessment Note    Subjective Comments:   Patient reports that his shoulder is about the same.  He reports that the surgeon thinks that it might be the hardware that is limiting him and if he would like to remove it, he can let the doctor know.  He reports that he evidently broke the wrist on his other hand when he jammed it a few weeks ago.  He reports that they have diagnosed him

## 2024-06-26 NOTE — PROGRESS NOTES
[facility] Note    Name: Mendel Ray McGill  Age: 63 y.o.  YOB: 1960  Gender: male  MRN: [id number]      Follow up visit:   1. Closed fracture of right wrist, initial encounter    2. Trigger finger, right ring finger    3. Trigger finger, left ring finger    4. Rheumatoid arthritis involving both hands with positive rheumatoid factor (HCC)        HPI: Mendel Ray McGill is a 63 y.o. male who is following up for an unrelated issue, the patient sustained an injury to the right wrist 3 weeks ago when he caught some ralph material and it hyperextended the wrist, since then he has had swelling and pain.      ROS/Meds/PSH/PMH/FH/SH: I personally reviewed the patients standard intake form.  Pertinents are discussed in the HPI    Physical Examination:  General: Awake and alert.  HEENT: Normocephalic, atraumatic  CV/Pulm: Breathing even and unlabored  Skin: No obvious rashes noted.  Lymphatic: No obvious evidence of lymphedema or lymphadenopathy    Musculoskeletal Examination:  Examination on the right upper extremity demonstrates cap refill < 5 seconds in all fingers, tenderness palpation of the distal radius, patient is able to make a full composite fist and he has fairly good wrist motion.    Imaging / Electrodiagnostic Tests:     right Wrist XR: AP, Lateral, Oblique views     Clinical Indication:  1. Closed fracture of right wrist, initial encounter    2. Trigger finger, right ring finger    3. Trigger finger, left ring finger    4. Rheumatoid arthritis involving both hands with positive rheumatoid factor (HCC)           Report: AP, lateral, oblique x-ray wrist XRs demonstrates Distal radius fracture with 30 degrees of dorsal tilt and dorsal comminution    Impression: as above     Karen Ac MD         Assessment:   1. Closed fracture of right wrist, initial encounter    2. Trigger finger, right ring finger    3. Trigger finger, left ring finger    4. Rheumatoid arthritis involving both

## 2024-06-27 ENCOUNTER — APPOINTMENT (OUTPATIENT)
Dept: PHYSICAL THERAPY | Age: 64
End: 2024-06-27
Attending: ORTHOPAEDIC SURGERY
Payer: COMMERCIAL

## 2024-07-02 ENCOUNTER — HOSPITAL ENCOUNTER (OUTPATIENT)
Dept: PHYSICAL THERAPY | Age: 64
Setting detail: RECURRING SERIES
Discharge: HOME | End: 2024-07-05
Attending: ORTHOPAEDIC SURGERY
Payer: COMMERCIAL

## 2024-07-02 PROCEDURE — 97140 MANUAL THERAPY 1/> REGIONS: CPT

## 2024-07-02 PROCEDURE — 97110 THERAPEUTIC EXERCISES: CPT

## 2024-07-02 ASSESSMENT — PAIN DESCRIPTION - ORIENTATION: ORIENTATION: LEFT

## 2024-07-02 ASSESSMENT — PAIN DESCRIPTION - LOCATION: LOCATION: SHOULDER

## 2024-07-02 ASSESSMENT — PAIN SCALES - GENERAL: PAINLEVEL_OUTOF10: 2

## 2024-07-02 NOTE — PROGRESS NOTES
Mendel Ray McGill  : 1960  Primary: Christopher Nsah (Gisela ALCALA)  Secondary:  [facility] @ [address]  [place] [address]  Phone: [phone]  Fax: [phone] Plan Frequency: 2 times per week  Plan of Care/Certification Expiration Date: 24        Plan of Care/Certification Expiration Date:  Plan of Care/Certification Expiration Date: 24    Frequency/Duration: Plan Frequency: 2 times per week     Time In/Out:   Time In: 812  Time Out: 852      PT Visit Info:    Total # of Visits Approved: 17  Progress Note Due Date: 24  Total # of Visits to Date: 36  Progress Note Counter: 3      Visit Count:  36    OUTPATIENT PHYSICAL THERAPY:   Treatment Note 2024       Episode  (PT: Left humeral fracture)               Treatment Diagnosis:    Acute pain of left shoulder  Pain in left wrist  Medical/Referring Diagnosis:    Closed fracture of shaft of left humerus, unspecified fracture morphology, initial encounter [S42.302A]  Left wrist pain [M25.532]  Referring Physician:  Huy Matson MD MD Orders:   Evaluate & Treat:  -Left Shoulder: Full Active/Passive ROM and Full Strengthening;   -Left Wrist: Full Active/Passive ROM and Full Strengthening.  Return MD Appt:  TBD  Date of Onset:  Onset Date: 24 (Surgery: 2024)  Allergies:   Shellfish allergy and Penicillins  Restrictions/Precautions:   No restrictions for L shoulder; strengthening restrictions for L wrist      Interventions Planned (Treatment may consist of any combination of the following):     See Assessment Note    Subjective Comments:   Patient reports he is doing ok overall.    Initial Pain Level:: Left Shoulder 2/10   Post Session Pain Level:  Left  Shoulder 2/10   Medications Last Reviewed:  2024  Updated Objective Findings:  None Today  Treatment   THERAPEUTIC EXERCISE: (30 minutes):    Exercises per grid below to improve mobility and strength.  Required minimal

## 2024-07-05 ENCOUNTER — HOSPITAL ENCOUNTER (OUTPATIENT)
Dept: PHYSICAL THERAPY | Age: 64
Setting detail: RECURRING SERIES
Discharge: HOME | End: 2024-07-08
Attending: ORTHOPAEDIC SURGERY
Payer: COMMERCIAL

## 2024-07-05 PROCEDURE — 97140 MANUAL THERAPY 1/> REGIONS: CPT

## 2024-07-05 PROCEDURE — 97110 THERAPEUTIC EXERCISES: CPT

## 2024-07-05 ASSESSMENT — PAIN DESCRIPTION - ORIENTATION: ORIENTATION: LEFT

## 2024-07-05 ASSESSMENT — PAIN SCALES - GENERAL: PAINLEVEL_OUTOF10: 2

## 2024-07-05 ASSESSMENT — PAIN DESCRIPTION - LOCATION: LOCATION: SHOULDER

## 2024-07-05 NOTE — PROGRESS NOTES
Mendel Ray McGill  : 1960  Primary: Christopher Nash (Gisela ALCALA)  Secondary:  [facility] [place] @ [address]  [place] [address]  Phone: [phone]  Fax: [phone] Plan Frequency: 2 times per week  Plan of Care/Certification Expiration Date: 24        Plan of Care/Certification Expiration Date:  Plan of Care/Certification Expiration Date: 24    Frequency/Duration: Plan Frequency: 2 times per week     Time In/Out:   Time In: 815  Time Out: 855      PT Visit Info:    Total # of Visits Approved: 17  Progress Note Due Date: 24  Total # of Visits to Date: 37  Progress Note Counter: 4      Visit Count:  37    OUTPATIENT PHYSICAL THERAPY:   Treatment Note 2024       Episode  (PT: Left humeral fracture)               Treatment Diagnosis:    Acute pain of left shoulder  Pain in left wrist  Medical/Referring Diagnosis:    Closed fracture of shaft of left humerus, unspecified fracture morphology, initial encounter [S42.302A]  Left wrist pain [M25.532]  Referring Physician:  Huy Matson MD MD Orders:   Evaluate & Treat:  -Left Shoulder: Full Active/Passive ROM and Full Strengthening;   -Left Wrist: Full Active/Passive ROM and Full Strengthening.  Return MD Appt:  TBD  Date of Onset:  Onset Date: 24 (Surgery: 2024)  Allergies:   Shellfish allergy and Penicillins  Restrictions/Precautions:   No restrictions for L shoulder; strengthening restrictions for L wrist      Interventions Planned (Treatment may consist of any combination of the following):     See Assessment Note    Subjective Comments:   Patient reports he is doing well today.    Initial Pain Level:: Left Shoulder 2/10   Post Session Pain Level:  Left  Shoulder 2/10   Medications Last Reviewed:  2024  Updated Objective Findings:  None Today  Treatment   THERAPEUTIC EXERCISE: (30 minutes):    Exercises per grid below to improve mobility and strength.  Required minimal

## 2024-07-09 ENCOUNTER — HOSPITAL ENCOUNTER (OUTPATIENT)
Dept: PHYSICAL THERAPY | Age: 64
Setting detail: RECURRING SERIES
Discharge: HOME | End: 2024-07-12
Attending: ORTHOPAEDIC SURGERY
Payer: COMMERCIAL

## 2024-07-09 PROCEDURE — 97140 MANUAL THERAPY 1/> REGIONS: CPT

## 2024-07-09 PROCEDURE — 97110 THERAPEUTIC EXERCISES: CPT

## 2024-07-09 ASSESSMENT — PAIN DESCRIPTION - ORIENTATION: ORIENTATION: LEFT

## 2024-07-09 ASSESSMENT — PAIN DESCRIPTION - LOCATION: LOCATION: SHOULDER

## 2024-07-09 ASSESSMENT — PAIN SCALES - GENERAL: PAINLEVEL_OUTOF10: 2

## 2024-07-09 NOTE — PROGRESS NOTES
EXERCISE: (30 minutes):    Exercises per grid below to improve mobility and strength.  Required minimal visual, verbal, and manual cues to promote proper body alignment, promote proper body posture, and promote proper body mechanics.  Progressed resistance, range, repetitions, and complexity of movement as indicated.   Date:  7/9/2024   Activity/Exercise Parameters   UBE 6 minutes  Level 4   Nautilus rows 50 pounds  15 reps  2 sets   Nautilus chest press 45 pounds  15 reps  2 sets   Bicep curls 10 pounds  15 reps  B UE   Bent over rows 10 pounds  15 reps  L UE   Bent over extension 8 pounds  15 reps  L UE   Bent over Tricep Extension 5 pounds  15 reps  L UE   Side-lying abduction 3 pounds  15 reps  L UE   Side-lying horizontal abduction 15 reps  L UE      MANUAL THERAPY: (10 minutes - L UE):   Joint mobilization and Soft tissue mobilization was utilized and necessary because of the patient's restricted joint motion and restricted motion of soft tissue. (Upper trap, lower trap, rhomboids, first rib, pectoralis major, and pectoralis minor.)    Treatment/Session Summary:    Treatment Assessment:   Patient tolerated treatment well with minimal changes in his shoulder.  Communication/Consultation:  None today  Equipment provided today:  None  Recommendations/Intent for next treatment session: Next visit will focus on improving overall mobility, strength, and pain with daily activities.    >Total Treatment Billable Duration:  40 minutes  Time In: 0815  Time Out: 0855     BRITTANI AVENDAÑO PT         Charge Capture  [employer] Portal  Appt Desk     Future Appointments   Date Time Provider Department Center   7/11/2024  8:15 AM Brittani Avendaño PT [facility]   7/16/2024  8:15 AM Brittani Avendaño, PT [facility]   7/18/2024  8:15 AM Brittani Avendaño PT [facility]   7/30/2024  8:15 AM Brittani Avendaño, PT [facility]   8/13/2024 10:00 AM Karen Ac MD [facility]

## 2024-07-11 ENCOUNTER — HOSPITAL ENCOUNTER (OUTPATIENT)
Dept: PHYSICAL THERAPY | Age: 64
Setting detail: RECURRING SERIES
Discharge: HOME | End: 2024-07-14
Attending: ORTHOPAEDIC SURGERY
Payer: COMMERCIAL

## 2024-07-11 PROCEDURE — 97110 THERAPEUTIC EXERCISES: CPT

## 2024-07-11 PROCEDURE — 97140 MANUAL THERAPY 1/> REGIONS: CPT

## 2024-07-11 ASSESSMENT — PAIN DESCRIPTION - ORIENTATION: ORIENTATION: LEFT

## 2024-07-11 ASSESSMENT — PAIN DESCRIPTION - LOCATION: LOCATION: SHOULDER

## 2024-07-11 ASSESSMENT — PAIN SCALES - GENERAL: PAINLEVEL_OUTOF10: 2

## 2024-07-11 NOTE — PROGRESS NOTES
Last visit 11/05/2020   Mendel Ray McGill  : 1960  Primary: Christopher Nash (Gisela ALCALA)  Secondary:  [facility] @ [address]  [place] [address]  Phone: [phone]  Fax: [phone] Plan Frequency: 2 times per week  Plan of Care/Certification Expiration Date: 24        Plan of Care/Certification Expiration Date:  Plan of Care/Certification Expiration Date: 24    Frequency/Duration: Plan Frequency: 2 times per week     Time In/Out:   Time In: 815  Time Out: 855      PT Visit Info:    Total # of Visits Approved: 17  Progress Note Due Date: 24  Total # of Visits to Date: 39  Progress Note Counter: 6      Visit Count:  39    OUTPATIENT PHYSICAL THERAPY:   Treatment Note 2024       Episode  (PT: Left humeral fracture)               Treatment Diagnosis:    Acute pain of left shoulder  Pain in left wrist  Medical/Referring Diagnosis:    Closed fracture of shaft of left humerus, unspecified fracture morphology, initial encounter [S42.302A]  Left wrist pain [M25.532]  Referring Physician:  Huy Matson MD MD Orders:   Evaluate & Treat:  -Left Shoulder: Full Active/Passive ROM and Full Strengthening;   -Left Wrist: Full Active/Passive ROM and Full Strengthening.  Return MD Appt:  TBD  Date of Onset:  Onset Date: 24 (Surgery: 2024)  Allergies:   Shellfish allergy and Penicillins  Restrictions/Precautions:   No restrictions for L shoulder; strengthening restrictions for L wrist      Interventions Planned (Treatment may consist of any combination of the following):     See Assessment Note    Subjective Comments:   Patient reports he is doing well.  He reports he goes to the GI doctor tomorrow to hopefully get some answers about his liver.    Initial Pain Level:: Left Shoulder 2/10   Post Session Pain Level:  Left  Shoulder 2/10   Medications Last Reviewed:  2024  Updated Objective Findings:  None Today  Treatment   THERAPEUTIC EXERCISE: 30  Patient is not pregnant (male or female)

## 2024-07-16 ENCOUNTER — HOSPITAL ENCOUNTER (OUTPATIENT)
Dept: PHYSICAL THERAPY | Age: 64
Setting detail: RECURRING SERIES
Discharge: HOME | End: 2024-07-19
Attending: ORTHOPAEDIC SURGERY
Payer: COMMERCIAL

## 2024-07-16 PROCEDURE — 97110 THERAPEUTIC EXERCISES: CPT

## 2024-07-16 PROCEDURE — 97140 MANUAL THERAPY 1/> REGIONS: CPT

## 2024-07-16 ASSESSMENT — PAIN SCALES - GENERAL: PAINLEVEL_OUTOF10: 2

## 2024-07-16 ASSESSMENT — PAIN DESCRIPTION - ORIENTATION: ORIENTATION: LEFT

## 2024-07-16 ASSESSMENT — PAIN DESCRIPTION - LOCATION: LOCATION: SHOULDER

## 2024-07-16 NOTE — PROGRESS NOTES
Mendel Ray McGill  : 1960  Primary: Christopher Nash (Gisela ALCALA)  Secondary:  [facility] @ [address]  [place] [address]  Phone: [phone]  Fax: [phone] Plan Frequency: 2 times per week  Plan of Care/Certification Expiration Date: 24        Plan of Care/Certification Expiration Date:  Plan of Care/Certification Expiration Date: 24    Frequency/Duration: Plan Frequency: 2 times per week     Time In/Out:   Time In: 815  Time Out: 855      PT Visit Info:    Total # of Visits Approved: 17  Progress Note Due Date: 24  Total # of Visits to Date: 40  Progress Note Counter: 7      Visit Count:  40    OUTPATIENT PHYSICAL THERAPY:   Treatment Note 2024       Episode  (PT: Left humeral fracture)               Treatment Diagnosis:    Acute pain of left shoulder  Pain in left wrist  Medical/Referring Diagnosis:    Closed fracture of shaft of left humerus, unspecified fracture morphology, initial encounter [S42.302A]  Left wrist pain [M25.532]  Referring Physician:  Huy Matson MD MD Orders:   Evaluate & Treat:  -Left Shoulder: Full Active/Passive ROM and Full Strengthening;   -Left Wrist: Full Active/Passive ROM and Full Strengthening.  Return MD Appt:  TBD  Date of Onset:  Onset Date: 24 (Surgery: 2024)  Allergies:   Shellfish allergy and Penicillins  Restrictions/Precautions:   No restrictions for L shoulder; strengthening restrictions for L wrist      Interventions Planned (Treatment may consist of any combination of the following):     See Assessment Note    Subjective Comments:   Patient reports he is doing ok today, just a little stiff.    Initial Pain Level:: Left Shoulder 2/10   Post Session Pain Level:  Left  Shoulder 2/10   Medications Last Reviewed:  2024  Updated Objective Findings:  None Today  Treatment   THERAPEUTIC EXERCISE: (30 minutes):    Exercises per grid below to improve mobility and strength.

## 2024-07-18 ENCOUNTER — HOSPITAL ENCOUNTER (OUTPATIENT)
Dept: PHYSICAL THERAPY | Age: 64
Setting detail: RECURRING SERIES
Discharge: HOME | End: 2024-07-21
Attending: ORTHOPAEDIC SURGERY
Payer: COMMERCIAL

## 2024-07-18 PROCEDURE — 97110 THERAPEUTIC EXERCISES: CPT

## 2024-07-18 PROCEDURE — 97140 MANUAL THERAPY 1/> REGIONS: CPT

## 2024-07-18 NOTE — PROGRESS NOTES
Mendel Ray McGill  : 1960  Primary: Christopher Nash (Gisela ALCALA)  Secondary:  [facility] @ [address]  [place] [address]  Phone: [phone]  Fax: [phone] Plan Frequency: 2 times per week  Plan of Care/Certification Expiration Date: 24        Plan of Care/Certification Expiration Date:  Plan of Care/Certification Expiration Date: 24    Frequency/Duration: Plan Frequency: 2 times per week      Time In/Out:   Time In: 815  Time Out: 08      PT Visit Info:    Total # of Visits Approved: 17  Progress Note Due Date: 24  Total # of Visits to Date: 41  Progress Note Counter: 1      Visit Count:  41                OUTPATIENT PHYSICAL THERAPY:             Progress Report 2024               Episode (PT: Left humeral fracture)         Treatment Diagnosis:     Acute pain of left shoulder  Pain in left wrist  Medical/Referring Diagnosis:    Closed fracture of shaft of left humerus, unspecified fracture morphology, initial encounter [S42.302A]  Left wrist pain [M25.532]  Referring Physician:  Huy Matson MD MD Orders:  Evaluate & Treat:  -Left Shoulder: Full Active/Passive ROM and Full Strengthening;   -Left Wrist: Full Active/Gentle Passive ROM and NO Strenghtening.   Return MD Appt:  2024  Date of Onset:  Onset Date: 24 (Surgery: 2024)  Allergies:  Shellfish allergy and Penicillins  Restrictions/Precautions:    No restrictions for L shoulder; strengthening restrictions for L wrist      Medications Last Reviewed:  2024     SUBJECTIVE   History of Injury/Illness (Reason for Referral):  2024 (Progress Note): Patient reports that he has lost a lot of muscle not only in his arms, but in his face and shoulders as well since the surgery and since the fluid build up in his abdomen.  He reports he doesn't think the two are related.  He reports that his shoulder is improving in range of motion, but now

## 2024-07-18 NOTE — PROGRESS NOTES
Mendel Ray McGill  : 1960  Primary: Christopher Nash (Gisela ALCALA)  Secondary:  [facility] @ [address]  [place] [address]  Phone: [phone]  Fax: [phone] Plan Frequency: 2 times per week  Plan of Care/Certification Expiration Date: 24        Plan of Care/Certification Expiration Date:  Plan of Care/Certification Expiration Date: 24    Frequency/Duration: Plan Frequency: 2 times per week     Time In/Out:   Time In: 815  Time Out: 855      PT Visit Info:    Total # of Visits Approved: 17  Progress Note Due Date: 24  Total # of Visits to Date: 41  Progress Note Counter: 1      Visit Count:  41    OUTPATIENT PHYSICAL THERAPY:   Treatment Note 2024       Episode  (PT: Left humeral fracture)               Treatment Diagnosis:    Acute pain of left shoulder  Pain in left wrist  Medical/Referring Diagnosis:    Closed fracture of shaft of left humerus, unspecified fracture morphology, initial encounter [S42.302A]  Left wrist pain [M25.532]  Referring Physician:  Huy Matson MD MD Orders:   Evaluate & Treat:  -Left Shoulder: Full Active/Passive ROM and Full Strengthening;   -Left Wrist: Full Active/Passive ROM and Full Strengthening.  Return MD Appt:  TBD  Date of Onset:  Onset Date: 24 (Surgery: 2024)  Allergies:   Shellfish allergy and Penicillins  Restrictions/Precautions:   No restrictions for L shoulder; strengthening restrictions for L wrist      Interventions Planned (Treatment may consist of any combination of the following):     See Assessment Note    Subjective Comments:   Patient reports he is doing well today, just stiff in his shoulder.    Initial Pain Level:: Left Shoulder 2/10   Post Session Pain Level:  Left  Shoulder 2/10   Medications Last Reviewed:  2024  Updated Objective Findings:  None Today  Treatment   THERAPEUTIC EXERCISE: (30 minutes):    Exercises per grid below to improve mobility and

## 2024-07-30 ENCOUNTER — HOSPITAL ENCOUNTER (OUTPATIENT)
Dept: PHYSICAL THERAPY | Age: 64
Setting detail: RECURRING SERIES
Discharge: HOME | End: 2024-08-02
Attending: ORTHOPAEDIC SURGERY
Payer: COMMERCIAL

## 2024-07-30 PROCEDURE — 97140 MANUAL THERAPY 1/> REGIONS: CPT

## 2024-07-30 PROCEDURE — 97110 THERAPEUTIC EXERCISES: CPT

## 2024-07-30 ASSESSMENT — PAIN DESCRIPTION - ORIENTATION: ORIENTATION: LEFT

## 2024-07-30 ASSESSMENT — PAIN SCALES - GENERAL: PAINLEVEL_OUTOF10: 2

## 2024-07-30 ASSESSMENT — PAIN DESCRIPTION - LOCATION: LOCATION: SHOULDER

## 2024-07-30 NOTE — PROGRESS NOTES
Mendel Ray McGill  : 1960  Primary: Christopher Nash (Gisela ALCALA)  Secondary:  [facility] @ [address]  [place] [address]  Phone: [phone]  Fax: [phone] Plan Frequency: 2 times per week  Plan of Care/Certification Expiration Date: 24        Plan of Care/Certification Expiration Date:  Plan of Care/Certification Expiration Date: 24    Frequency/Duration: Plan Frequency: 2 times per week     Time In/Out:   Time In: 815  Time Out: 855      PT Visit Info:    Total # of Visits Approved: 17  Progress Note Due Date: 24  Total # of Visits to Date: 42  Progress Note Counter: 2      Visit Count:  42    OUTPATIENT PHYSICAL THERAPY:   Treatment Note 2024       Episode  (PT: Left humeral fracture)               Treatment Diagnosis:    Acute pain of left shoulder  Pain in left wrist  Medical/Referring Diagnosis:    Closed fracture of shaft of left humerus, unspecified fracture morphology, initial encounter [S42.302A]  Left wrist pain [M25.532]  Referring Physician:  Huy Matson MD MD Orders:   Evaluate & Treat:  -Left Shoulder: Full Active/Passive ROM and Full Strengthening;   -Left Wrist: Full Active/Passive ROM and Full Strengthening.  Return MD Appt:  TBD  Date of Onset:  Onset Date: 24 (Surgery: 2024)  Allergies:   Shellfish allergy and Penicillins  Restrictions/Precautions:   No restrictions for L shoulder; strengthening restrictions for L wrist      Interventions Planned (Treatment may consist of any combination of the following):     See Assessment Note    Subjective Comments:   Patient reports he did well while on vacation.    Initial Pain Level:: Left Shoulder 2/10   Post Session Pain Level:  Left  Shoulder 2/10   Medications Last Reviewed:  2024  Updated Objective Findings:  None Today  Treatment   THERAPEUTIC EXERCISE: (30 minutes):    Exercises per grid below to improve mobility and strength.  Required

## 2024-08-08 ENCOUNTER — HOSPITAL ENCOUNTER (OUTPATIENT)
Dept: PHYSICAL THERAPY | Age: 64
Setting detail: RECURRING SERIES
Discharge: HOME | End: 2024-08-11
Attending: ORTHOPAEDIC SURGERY
Payer: COMMERCIAL

## 2024-08-08 PROCEDURE — 97110 THERAPEUTIC EXERCISES: CPT

## 2024-08-08 PROCEDURE — 97140 MANUAL THERAPY 1/> REGIONS: CPT

## 2024-08-08 ASSESSMENT — PAIN DESCRIPTION - ORIENTATION: ORIENTATION: LEFT

## 2024-08-08 ASSESSMENT — PAIN SCALES - GENERAL: PAINLEVEL_OUTOF10: 2

## 2024-08-08 ASSESSMENT — PAIN DESCRIPTION - LOCATION: LOCATION: SHOULDER

## 2024-08-08 NOTE — PROGRESS NOTES
Mendel Ray McGill  : 1960  Primary: Christopher Nash (Gisela ALCALA)  Secondary:  [facility] @ [address]  [place] [address]  Phone: [phone]  Fax: [phone] Plan Frequency: 2 times per week  Plan of Care/Certification Expiration Date: 24        Plan of Care/Certification Expiration Date:  Plan of Care/Certification Expiration Date: 24    Frequency/Duration: Plan Frequency: 2 times per week     Time In/Out:   Time In: 0945  Time Out: 1025      PT Visit Info:    Total # of Visits Approved: 17  Progress Note Due Date: 24  Total # of Visits to Date: 43  Progress Note Counter: 3      Visit Count:  43    OUTPATIENT PHYSICAL THERAPY:   Treatment Note 2024       Episode  (PT: Left humeral fracture)               Treatment Diagnosis:    Acute pain of left shoulder  Pain in left wrist  Medical/Referring Diagnosis:    Closed fracture of shaft of left humerus, unspecified fracture morphology, initial encounter [S42.302A]  Left wrist pain [M25.532]  Referring Physician:  Huy Matson MD MD Orders:   Evaluate & Treat:  -Left Shoulder: Full Active/Passive ROM and Full Strengthening;   -Left Wrist: Full Active/Passive ROM and Full Strengthening.  Return MD Appt:  TBD  Date of Onset:  Onset Date: 24 (Surgery: 2024)  Allergies:   Shellfish allergy and Penicillins  Restrictions/Precautions:   No restrictions for L shoulder; strengthening restrictions for L wrist      Interventions Planned (Treatment may consist of any combination of the following):     See Assessment Note    Subjective Comments:   Patient reports he feels stiff today.    Initial Pain Level:: Left Shoulder 2/10   Post Session Pain Level:  Left  Shoulder 2/10   Medications Last Reviewed:  2024  Updated Objective Findings:  None Today  Treatment   THERAPEUTIC EXERCISE: (30 minutes):    Exercises per grid below to improve mobility and strength.  Required minimal

## 2024-08-13 ENCOUNTER — OFFICE VISIT (OUTPATIENT)
Age: 64
End: 2024-08-13
Payer: COMMERCIAL

## 2024-08-13 DIAGNOSIS — M65.341 TRIGGER FINGER, RIGHT RING FINGER: ICD-10-CM

## 2024-08-13 DIAGNOSIS — M05.742 RHEUMATOID ARTHRITIS INVOLVING BOTH HANDS WITH POSITIVE RHEUMATOID FACTOR (HCC): ICD-10-CM

## 2024-08-13 DIAGNOSIS — M65.342 TRIGGER FINGER, LEFT RING FINGER: ICD-10-CM

## 2024-08-13 DIAGNOSIS — S62.101A CLOSED FRACTURE OF RIGHT WRIST, INITIAL ENCOUNTER: Primary | ICD-10-CM

## 2024-08-13 DIAGNOSIS — M05.741 RHEUMATOID ARTHRITIS INVOLVING BOTH HANDS WITH POSITIVE RHEUMATOID FACTOR (HCC): ICD-10-CM

## 2024-08-13 DIAGNOSIS — M77.8 EXTENSOR POLLICIS LONGUS TENDINITIS: ICD-10-CM

## 2024-08-13 PROCEDURE — 99214 OFFICE O/P EST MOD 30 MIN: CPT | Performed by: ORTHOPAEDIC SURGERY

## 2024-08-13 NOTE — PROGRESS NOTES
[facility] Note    Name: Mendel Ray McGill  Age: 63 y.o.  YOB: 1960  Gender: male  MRN: [id number]      Follow up visit:   1. Closed fracture of right wrist, initial encounter    2. Extensor pollicis longus tendinitis    3. Trigger finger, right ring finger    4. Trigger finger, left ring finger    5. Rheumatoid arthritis involving both hands with positive rheumatoid factor (HCC)        HPI: Mendel Ray McGill is a 63 y.o. male who is following up for right wrist fracture, patient reports ongoing swelling and pain in the right wrist, he is also concerned that on the left hand he has inability to fully extend the thumb, this happened a few weeks ago, it was painless, to recap his history he does have a left distal radius fracture treated conservatively 7 months ago.      ROS/Meds/PSH/PMH/FH/SH: I personally reviewed the patients standard intake form.  Pertinents are discussed in the HPI    Physical Examination:  General: Awake and alert.  HEENT: Normocephalic, atraumatic  CV/Pulm: Breathing even and unlabored  Skin: No obvious rashes noted.  Lymphatic: No obvious evidence of lymphedema or lymphadenopathy    Musculoskeletal Examination:  Examination on the bilateral upper extremity demonstrates cap refill < 5 seconds in all fingers, tenderness palpation of the TFCC fovea, worse on the right than the left, no tenderness palpation of the distal radius on the right, some discomfort with rotation of the radiocarpal joint on the right, on the left side he has no function of the EPL tendon.    Imaging / Electrodiagnostic Tests:     right Wrist XR: AP, Lateral, Oblique views     Clinical Indication:  1. Closed fracture of right wrist, initial encounter    2. Extensor pollicis longus tendinitis    3. Trigger finger, right ring finger    4. Trigger finger, left ring finger    5. Rheumatoid arthritis involving both hands with positive rheumatoid factor (HCC)           Report: AP, lateral, oblique

## 2024-08-15 ENCOUNTER — HOSPITAL ENCOUNTER (OUTPATIENT)
Dept: PHYSICAL THERAPY | Age: 64
Setting detail: RECURRING SERIES
Discharge: HOME | End: 2024-08-18
Attending: ORTHOPAEDIC SURGERY
Payer: COMMERCIAL

## 2024-08-15 PROCEDURE — 97140 MANUAL THERAPY 1/> REGIONS: CPT

## 2024-08-15 PROCEDURE — 97110 THERAPEUTIC EXERCISES: CPT

## 2024-08-15 ASSESSMENT — PAIN SCALES - GENERAL: PAINLEVEL_OUTOF10: 2

## 2024-08-15 ASSESSMENT — PAIN DESCRIPTION - LOCATION: LOCATION: SHOULDER

## 2024-08-15 ASSESSMENT — PAIN DESCRIPTION - ORIENTATION: ORIENTATION: LEFT

## 2024-08-15 NOTE — PROGRESS NOTES
Exercises per grid below to improve mobility and strength.  Required minimal visual, verbal, and manual cues to promote proper body alignment, promote proper body posture, and promote proper body mechanics.  Progressed resistance, range, repetitions, and complexity of movement as indicated.   Date:  8/15/2024   Activity/Exercise Parameters   UBE 6 minutes  Level 4   Nautilus rows 50 pounds  15 reps  2 sets   Nautilus chest press 45 pounds  15 reps  2 sets   Bicep curls 10 pounds  15 reps  B UE   Bent over rows 10 pounds  15 reps  L UE   Bent over extension 8 pounds  15 reps  L UE   Bent over Tricep Extension 5 pounds  15 reps  L UE   Side-lying abduction 3 pounds  15 reps  L UE   Side-lying horizontal abduction 15 reps  L UE      MANUAL THERAPY: (10 minutes - L UE):   Joint mobilization and Soft tissue mobilization was utilized and necessary because of the patient's restricted joint motion and restricted motion of soft tissue. (Upper trap, lower trap, rhomboids, first rib, pectoralis major, and pectoralis minor.)    Treatment/Session Summary:    Treatment Assessment:   Patient tolerated treatment well with continued popping in his left shoulder with flexion.  Communication/Consultation:  None today  Equipment provided today:  None  Recommendations/Intent for next treatment session: Next visit will focus on improving overall mobility, strength, and pain with daily activities.    >Total Treatment Billable Duration:  40 minutes  Time In: 0900  Time Out: 0940     CHARAN AVENDAÑO PT         Charge Capture  [employer] [facility]  Appt Desk     Future Appointments   Date Time Provider Department Center   8/22/2024  9:00 AM Charan Avendaño PT [facility]   8/29/2024  9:00 AM Charan Avendaño PT [facility]   10/15/2024  9:00 [facility]

## 2024-08-15 NOTE — THERAPY RECERTIFICATION
Mendel Ray McGill  : 1960  Primary: Christopher Nash (Gisela ALCALA)  Secondary:  [facility] @ [address]  [place] [address]  Phone: [phone]  Fax: [phone] Plan Frequency: 2 times per week  Plan of Care/Certification Expiration Date: 24        Plan of Care/Certification Expiration Date:  Plan of Care/Certification Expiration Date: 24    Frequency/Duration: Plan Frequency: 2 times per week      Time In/Out:   Time In: 0900  Time Out: 0940      PT Visit Info:    Total # of Visits Approved: 17  Progress Note Due Date: 24  Total # of Visits to Date: 44  Progress Note Counter: 1      Visit Count:  44                OUTPATIENT PHYSICAL THERAPY:             Recertification 8/15/2024               Episode (PT: Left humeral fracture)         Treatment Diagnosis:     Acute pain of left shoulder  Pain in left wrist  Medical/Referring Diagnosis:    Closed fracture of shaft of left humerus, unspecified fracture morphology, initial encounter [S42.302A]  Left wrist pain [M25.532]  Referring Physician:  Huy Matson MD MD Orders:  Evaluate & Treat:  -Left Shoulder: Full Active/Passive ROM and Full Strengthening;   -Left Wrist: Full Active/Gentle Passive ROM and NO Strenghtening.   Return MD Appt:  2024  Date of Onset:  Onset Date: 24 (Surgery: 2024)  Allergies:  Shellfish allergy and Penicillins  Restrictions/Precautions:    No restrictions for L shoulder; strengthening restrictions for L wrist      Medications Last Reviewed:  8/15/2024     SUBJECTIVE   History of Injury/Illness (Reason for Referral):  2024 (Progress Note): Patient reports that he is doing ok. He reports that his shoulder still clicks and pops when he moves it in certain directions.  He reports that overall he's doing well.    8/15/2024 (Recertification): Patient reports his shoulder still has popping when he moves it overhead.    Initial Pain

## 2024-08-20 ENCOUNTER — PREP FOR PROCEDURE (OUTPATIENT)
Dept: ORTHOPEDIC SURGERY | Age: 64
End: 2024-08-20

## 2024-08-20 ENCOUNTER — OFFICE VISIT (OUTPATIENT)
Dept: ORTHOPEDIC SURGERY | Age: 64
End: 2024-08-20
Payer: COMMERCIAL

## 2024-08-20 DIAGNOSIS — T84.84XA PAINFUL ORTHOPAEDIC HARDWARE (HCC): ICD-10-CM

## 2024-08-20 DIAGNOSIS — S42.302A CLOSED FRACTURE OF SHAFT OF LEFT HUMERUS, UNSPECIFIED FRACTURE MORPHOLOGY, INITIAL ENCOUNTER: Primary | ICD-10-CM

## 2024-08-20 PROCEDURE — 99213 OFFICE O/P EST LOW 20 MIN: CPT | Performed by: ORTHOPAEDIC SURGERY

## 2024-08-20 NOTE — H&P
normal. Septum midline. Mucosa normal. No drainage or sinus tenderness.   Mouth/Throat: Lips, mucosa, and tongue normal. Teeth and gums normal.   Neck: Supple, symmetrical, trachea midline, no adenopathy, thyroid: no enlargment/tenderness/nodules, no carotid bruit and no JVD.   Back:   Symmetric, no curvature. ROM normal. No CVA tenderness.   Lungs:   Clear to auscultation bilaterally.   Heart:  Regular rate and rhythm, S1, S2 normal, no murmur, click, rub or gallop.   Abdomen:   Soft, non-tender. Bowel sounds normal. No masses,  No organomegaly.       No lymphadenopathy in all 4 extremities  Alignment-he has near normal alignment of his left shoulder  Range of motion-he has only about 85 degrees or so of active abduction and whenever he is lying recumbent with forward flexion and abduction he has a palpable clunk when he gets to a certain point and there is significant crepitation in his shoulder with anything past 90 degrees of abduction as well  Vascular-distal pulses palpable in left upper extremity  Sensory/motor-deep tendon reflexes normal left upper extremity.  Motor and sensory function intact.  Stability-no obvious instability besides this clunking and catching he has with abduction  Tenderness to palpation over the lateral aspect of the left humerus  Skin-incision is completely healed  Gait-normal    Assessment:       Painful orthopedic hardware left shoulder/prominent hardware left shoulder/mechanical complication of internal orthopedic device    Xrays and or studies:    [id number] left proximal humerus fracture, findings-true AP and lateral views of the left shoulder shows the left proximal humerus fracture appears to have healed and healed in very reasonable position.  The nail is in excellent position with no evidence of loosening or failure of any of the hardware.  He does appear that his nail could be just a little bit proud laterally in the area of the greater tuberosity in the area of

## 2024-08-20 NOTE — PROGRESS NOTES
insertion of his rotator cuff.  This is longstanding consistent with his previous x-rays.  Impression-healed left proximal humerus fracture with prominent hardware left humeral head  Plan:     Soft talked to the patient with a couple of things.  I do think that what we are feeling on exam is likely the nail coming into contact with his acromion.  We talked about different options.  One of the options would be just hardware removal retried some of the screws.  I would not be against this and he really want me to try this however after his exam and really study his x-rays in detail I do think this more likely than nail itself versus the proximal interlock screws that are giving him his issues.  So I do think it would probably be best just to go ahead and take all the hardware out.  He is going to consider this but right now he is thinking he would like us to proceed.  We have sort of tentatively got him on the schedule for the first week in September.  As long as he is okay with this we will plan to proceed with outpatient removal hardware left shoulder and proximal humerus on that date    Signed By: Huy Matson MD     August 20, 2024

## 2024-08-22 ENCOUNTER — HOSPITAL ENCOUNTER (OUTPATIENT)
Dept: PHYSICAL THERAPY | Age: 64
Setting detail: RECURRING SERIES
Discharge: HOME | End: 2024-08-25
Attending: ORTHOPAEDIC SURGERY
Payer: COMMERCIAL

## 2024-08-22 PROCEDURE — 97110 THERAPEUTIC EXERCISES: CPT

## 2024-08-22 PROCEDURE — 97140 MANUAL THERAPY 1/> REGIONS: CPT

## 2024-08-22 ASSESSMENT — PAIN DESCRIPTION - LOCATION: LOCATION: SHOULDER

## 2024-08-22 ASSESSMENT — PAIN SCALES - GENERAL: PAINLEVEL_OUTOF10: 2

## 2024-08-22 ASSESSMENT — PAIN DESCRIPTION - ORIENTATION: ORIENTATION: LEFT

## 2024-08-22 NOTE — PROGRESS NOTES
Mendel Ray McGill  : 1960  Primary: Christopher Nash (Gisela ALCALA)  Secondary:  [facility] @ [address]  [place] [address]  Phone: [phone]  Fax: [phone] Plan Frequency: 2 times per week  Plan of Care/Certification Expiration Date: 24        Plan of Care/Certification Expiration Date:  Plan of Care/Certification Expiration Date: 24    Frequency/Duration: Plan Frequency: 2 times per week     Time In/Out:   Time In: 0900  Time Out: 0940      PT Visit Info:    Total # of Visits Approved: 17  Progress Note Due Date: 24  Total # of Visits to Date: 45  Progress Note Counter: 5      Visit Count:  45    OUTPATIENT PHYSICAL THERAPY:   Treatment Note 2024       Episode  (PT: Left humeral fracture)               Treatment Diagnosis:    Acute pain of left shoulder  Pain in left wrist  Medical/Referring Diagnosis:    Closed fracture of shaft of left humerus, unspecified fracture morphology, initial encounter [S42.302A]  Left wrist pain [M25.532]  Referring Physician:  Huy Matson MD MD Orders:   Evaluate & Treat:  -Left Shoulder: Full Active/Passive ROM and Full Strengthening;   -Left Wrist: Full Active/Passive ROM and Full Strengthening.  Return MD Appt:  TBD  Date of Onset:  Onset Date: 24 (Surgery: 2024)  Allergies:   Shellfish allergy and Penicillins  Restrictions/Precautions:   No restrictions for L shoulder; strengthening restrictions for L wrist      Interventions Planned (Treatment may consist of any combination of the following):     See Assessment Note    Subjective Comments:   Patient reports he is going to have surgery on his arm in September.  He reports his arm is sore.    Initial Pain Level:: Left Shoulder 2/10   Post Session Pain Level:  Left  Shoulder 2/10   Medications Last Reviewed:  2024  Updated Objective Findings:  None Today  Treatment   THERAPEUTIC EXERCISE: (30 minutes):    Exercises per grid  below to improve mobility and strength.  Required minimal visual, verbal, and manual cues to promote proper body alignment, promote proper body posture, and promote proper body mechanics.  Progressed resistance, range, repetitions, and complexity of movement as indicated.   Date:  8/22/2024   Activity/Exercise Parameters   UBE 6 minutes  Level 4   Nautilus rows 50 pounds  15 reps  2 sets   Nautilus chest press 45 pounds  15 reps  2 sets   Bicep curls 10 pounds  15 reps  B UE   Bent over rows 10 pounds  15 reps  L UE   Bent over extension 8 pounds  15 reps  L UE   Bent over Tricep Extension 5 pounds  15 reps  L UE   Side-lying abduction 3 pounds  15 reps  L UE   Side-lying horizontal abduction 15 reps  L UE      MANUAL THERAPY: (10 minutes - L UE):   Joint mobilization and Soft tissue mobilization was utilized and necessary because of the patient's restricted joint motion and restricted motion of soft tissue. (Upper trap, lower trap, rhomboids, first rib, pectoralis major, and pectoralis minor.)    Treatment/Session Summary:    Treatment Assessment:   Patient tolerated treatment well with minimal changes.  Communication/Consultation:  None today  Equipment provided today:  None  Recommendations/Intent for next treatment session: Next visit will focus on improving overall mobility, strength, and pain with daily activities.    >Total Treatment Billable Duration:  40 minutes  Time In: 0900  Time Out: 0940     BRITTANI AVENDAÑO PT         Charge Capture  [facility] [facility]  Appt Desk     Future Appointments   Date Time Provider Department Center   8/29/2024  9:00 AM Brittani Avendaño PT [facility]   9/12/2024  9:00 AM Brittani Avendaño, PT [facility]   9/17/2024  9:00 AM Brittani Avendaño, PT [facility]   9/19/2024  8:15 AM Brittani Avendaño, PT [facility]   9/19/2024  9:45 AM Huy Matson MD [facility]   9/26/2024  9:00 AM Brittani Avendaño PT [facility]   10/3/2024  9:00 AM Brittani Avendaño, PT [facility]

## 2024-08-27 NOTE — PROGRESS NOTES
I am accessing Mr. Moseley's chart as a part of our department's internal chart auditing process.  I certify that Mr. Moseley is, or was, a patient in our department.  Thank you,  Jacinta Shen, PT  8/27/2024

## 2024-08-29 ENCOUNTER — HOSPITAL ENCOUNTER (OUTPATIENT)
Dept: PHYSICAL THERAPY | Age: 64
Setting detail: RECURRING SERIES
End: 2024-08-29
Attending: ORTHOPAEDIC SURGERY
Payer: COMMERCIAL

## 2024-08-29 PROCEDURE — 97110 THERAPEUTIC EXERCISES: CPT

## 2024-08-29 PROCEDURE — 97140 MANUAL THERAPY 1/> REGIONS: CPT

## 2024-08-29 ASSESSMENT — PAIN SCALES - GENERAL: PAINLEVEL_OUTOF10: 2

## 2024-08-29 ASSESSMENT — PAIN DESCRIPTION - LOCATION: LOCATION: SHOULDER

## 2024-08-29 ASSESSMENT — PAIN DESCRIPTION - ORIENTATION: ORIENTATION: LEFT

## 2024-08-29 NOTE — PROGRESS NOTES
Mendel Ray McGill  : 1960  Primary: Christopher Nash (Gisela ALCALA)  Secondary:  [facility] @ [address]  [place] [address]  Phone: [phone]  Fax: [phone] Plan Frequency: 2 times per week  Plan of Care/Certification Expiration Date: 24        Plan of Care/Certification Expiration Date:  Plan of Care/Certification Expiration Date: 24    Frequency/Duration: Plan Frequency: 2 times per week     Time In/Out:   Time In: 0900  Time Out: 0940      PT Visit Info:    Total # of Visits Approved: 17  Progress Note Due Date: 24  Total # of Visits to Date: 46  Progress Note Counter: 6      Visit Count:  46    OUTPATIENT PHYSICAL THERAPY:   Treatment Note 2024       Episode  (PT: Left humeral fracture)               Treatment Diagnosis:    Acute pain of left shoulder  Pain in left wrist  Medical/Referring Diagnosis:    Closed fracture of shaft of left humerus, unspecified fracture morphology, initial encounter [S42.302A]  Left wrist pain [M25.532]  Referring Physician:  Huy Matson MD MD Orders:   Evaluate & Treat:  -Left Shoulder: Full Active/Passive ROM and Full Strengthening;   -Left Wrist: Full Active/Passive ROM and Full Strengthening.  Return MD Appt:  TBD  Date of Onset:  Onset Date: 24 (Surgery: 2024)  Allergies:   Seasonal, Shellfish allergy, and Penicillins  Restrictions/Precautions:   No restrictions for L shoulder; strengthening restrictions for L wrist      Interventions Planned (Treatment may consist of any combination of the following):     See Assessment Note    Subjective Comments:   Patient reports he is ready for surgery.    Initial Pain Level:: Left Shoulder 2/10   Post Session Pain Level:  Left  Shoulder 2/10   Medications Last Reviewed:  2024  Updated Objective Findings:  None Today  Treatment   THERAPEUTIC EXERCISE: (30 minutes):    Exercises per grid below to improve mobility and strength.

## 2024-09-03 ENCOUNTER — TELEPHONE (OUTPATIENT)
Dept: ORTHOPEDIC SURGERY | Age: 64
End: 2024-09-03

## 2024-09-03 NOTE — TELEPHONE ENCOUNTER
Called pt and advised him per VO Dr. Matson case is CX for 9/4/24 due to Urgent/Trauma cases in hospital. Will call pt to RS. Pt voiced complete Understanding.LH

## 2024-09-04 ENCOUNTER — TELEPHONE (OUTPATIENT)
Dept: ORTHOPEDIC SURGERY | Age: 64
End: 2024-09-04

## 2024-09-04 NOTE — TELEPHONE ENCOUNTER
Returned call to Pt and advised Surgery can be Rescheduled 9/11/2024 pt was ok with this date. Case request message sent, Post op appt moved. Pt stated he has MyChart and will get appts off there. LH

## 2024-09-11 ENCOUNTER — APPOINTMENT (OUTPATIENT)
Dept: GENERAL RADIOLOGY | Age: 64
End: 2024-09-11
Attending: ORTHOPAEDIC SURGERY
Payer: COMMERCIAL

## 2024-09-11 ENCOUNTER — HOSPITAL ENCOUNTER (OUTPATIENT)
Age: 64
Setting detail: OUTPATIENT SURGERY
Discharge: HOME | End: 2024-09-11
Attending: ORTHOPAEDIC SURGERY | Admitting: ORTHOPAEDIC SURGERY
Payer: COMMERCIAL

## 2024-09-11 ENCOUNTER — ANESTHESIA EVENT (OUTPATIENT)
Dept: SURGERY | Age: 64
End: 2024-09-11
Payer: COMMERCIAL

## 2024-09-11 ENCOUNTER — ANESTHESIA (OUTPATIENT)
Dept: SURGERY | Age: 64
End: 2024-09-11
Payer: COMMERCIAL

## 2024-09-11 VITALS
BODY MASS INDEX: 24.83 KG/M2 | RESPIRATION RATE: 16 BRPM | HEIGHT: 68 IN | WEIGHT: 163.8 LBS | HEART RATE: 75 BPM | SYSTOLIC BLOOD PRESSURE: 115 MMHG | OXYGEN SATURATION: 97 % | DIASTOLIC BLOOD PRESSURE: 70 MMHG | TEMPERATURE: 97.8 F

## 2024-09-11 DIAGNOSIS — T84.84XA PAINFUL ORTHOPAEDIC HARDWARE (HCC): Primary | ICD-10-CM

## 2024-09-11 LAB — POTASSIUM BLD-SCNC: 4.2 MMOL/L (ref 3.5–5.1)

## 2024-09-11 PROCEDURE — 2580000003 HC RX 258: Performed by: NURSE ANESTHETIST, CERTIFIED REGISTERED

## 2024-09-11 PROCEDURE — 6360000002 HC RX W HCPCS: Performed by: ANESTHESIOLOGY

## 2024-09-11 PROCEDURE — 3600000012 HC SURGERY LEVEL 2 ADDTL 15MIN: Performed by: ORTHOPAEDIC SURGERY

## 2024-09-11 PROCEDURE — 6370000000 HC RX 637 (ALT 250 FOR IP): Performed by: ANESTHESIOLOGY

## 2024-09-11 PROCEDURE — 3600000002 HC SURGERY LEVEL 2 BASE: Performed by: ORTHOPAEDIC SURGERY

## 2024-09-11 PROCEDURE — 2500000003 HC RX 250 WO HCPCS: Performed by: NURSE ANESTHETIST, CERTIFIED REGISTERED

## 2024-09-11 PROCEDURE — 6360000002 HC RX W HCPCS: Performed by: NURSE ANESTHETIST, CERTIFIED REGISTERED

## 2024-09-11 PROCEDURE — 2580000003 HC RX 258: Performed by: ANESTHESIOLOGY

## 2024-09-11 PROCEDURE — 6360000002 HC RX W HCPCS: Performed by: ORTHOPAEDIC SURGERY

## 2024-09-11 PROCEDURE — 2709999900 HC NON-CHARGEABLE SUPPLY: Performed by: ORTHOPAEDIC SURGERY

## 2024-09-11 PROCEDURE — 7100000011 HC PHASE II RECOVERY - ADDTL 15 MIN: Performed by: ORTHOPAEDIC SURGERY

## 2024-09-11 PROCEDURE — 84132 ASSAY OF SERUM POTASSIUM: CPT

## 2024-09-11 PROCEDURE — 7100000001 HC PACU RECOVERY - ADDTL 15 MIN: Performed by: ORTHOPAEDIC SURGERY

## 2024-09-11 PROCEDURE — 7100000000 HC PACU RECOVERY - FIRST 15 MIN: Performed by: ORTHOPAEDIC SURGERY

## 2024-09-11 PROCEDURE — 73030 X-RAY EXAM OF SHOULDER: CPT

## 2024-09-11 PROCEDURE — 3700000001 HC ADD 15 MINUTES (ANESTHESIA): Performed by: ORTHOPAEDIC SURGERY

## 2024-09-11 PROCEDURE — 7100000010 HC PHASE II RECOVERY - FIRST 15 MIN: Performed by: ORTHOPAEDIC SURGERY

## 2024-09-11 PROCEDURE — 3700000000 HC ANESTHESIA ATTENDED CARE: Performed by: ORTHOPAEDIC SURGERY

## 2024-09-11 RX ORDER — SODIUM CHLORIDE, SODIUM LACTATE, POTASSIUM CHLORIDE, CALCIUM CHLORIDE 600; 310; 30; 20 MG/100ML; MG/100ML; MG/100ML; MG/100ML
INJECTION, SOLUTION INTRAVENOUS CONTINUOUS
Status: DISCONTINUED | OUTPATIENT
Start: 2024-09-11 | End: 2024-09-11 | Stop reason: HOSPADM

## 2024-09-11 RX ORDER — DEXAMETHASONE SODIUM PHOSPHATE 4 MG/ML
INJECTION, SOLUTION INTRA-ARTICULAR; INTRALESIONAL; INTRAMUSCULAR; INTRAVENOUS; SOFT TISSUE PRN
Status: DISCONTINUED | OUTPATIENT
Start: 2024-09-11 | End: 2024-09-11 | Stop reason: SDUPTHER

## 2024-09-11 RX ORDER — ONDANSETRON 2 MG/ML
4 INJECTION INTRAMUSCULAR; INTRAVENOUS
Status: COMPLETED | OUTPATIENT
Start: 2024-09-11 | End: 2024-09-11

## 2024-09-11 RX ORDER — OXYCODONE HYDROCHLORIDE 5 MG/1
5 TABLET ORAL
Status: COMPLETED | OUTPATIENT
Start: 2024-09-11 | End: 2024-09-11

## 2024-09-11 RX ORDER — HYDROMORPHONE HYDROCHLORIDE 2 MG/ML
0.5 INJECTION, SOLUTION INTRAMUSCULAR; INTRAVENOUS; SUBCUTANEOUS EVERY 5 MIN PRN
Status: DISCONTINUED | OUTPATIENT
Start: 2024-09-11 | End: 2024-09-11 | Stop reason: HOSPADM

## 2024-09-11 RX ORDER — LIDOCAINE HYDROCHLORIDE 20 MG/ML
INJECTION, SOLUTION EPIDURAL; INFILTRATION; INTRACAUDAL; PERINEURAL PRN
Status: DISCONTINUED | OUTPATIENT
Start: 2024-09-11 | End: 2024-09-11 | Stop reason: SDUPTHER

## 2024-09-11 RX ORDER — SODIUM CHLORIDE 9 MG/ML
INJECTION, SOLUTION INTRAVENOUS PRN
Status: DISCONTINUED | OUTPATIENT
Start: 2024-09-11 | End: 2024-09-11 | Stop reason: HOSPADM

## 2024-09-11 RX ORDER — SODIUM CHLORIDE 0.9 % (FLUSH) 0.9 %
5-40 SYRINGE (ML) INJECTION PRN
Status: DISCONTINUED | OUTPATIENT
Start: 2024-09-11 | End: 2024-09-11 | Stop reason: HOSPADM

## 2024-09-11 RX ORDER — PROCHLORPERAZINE EDISYLATE 5 MG/ML
5 INJECTION INTRAMUSCULAR; INTRAVENOUS
Status: COMPLETED | OUTPATIENT
Start: 2024-09-11 | End: 2024-09-11

## 2024-09-11 RX ORDER — NALOXONE HYDROCHLORIDE 0.4 MG/ML
INJECTION, SOLUTION INTRAMUSCULAR; INTRAVENOUS; SUBCUTANEOUS PRN
Status: DISCONTINUED | OUTPATIENT
Start: 2024-09-11 | End: 2024-09-11 | Stop reason: HOSPADM

## 2024-09-11 RX ORDER — HYDROMORPHONE HYDROCHLORIDE 2 MG/ML
INJECTION, SOLUTION INTRAMUSCULAR; INTRAVENOUS; SUBCUTANEOUS PRN
Status: DISCONTINUED | OUTPATIENT
Start: 2024-09-11 | End: 2024-09-11 | Stop reason: SDUPTHER

## 2024-09-11 RX ORDER — PROPOFOL 10 MG/ML
INJECTION, EMULSION INTRAVENOUS PRN
Status: DISCONTINUED | OUTPATIENT
Start: 2024-09-11 | End: 2024-09-11 | Stop reason: SDUPTHER

## 2024-09-11 RX ORDER — HYDROMORPHONE HYDROCHLORIDE 2 MG/ML
0.5 INJECTION, SOLUTION INTRAMUSCULAR; INTRAVENOUS; SUBCUTANEOUS EVERY 5 MIN PRN
Status: COMPLETED | OUTPATIENT
Start: 2024-09-11 | End: 2024-09-11

## 2024-09-11 RX ORDER — OXYCODONE AND ACETAMINOPHEN 5; 325 MG/1; MG/1
2 TABLET ORAL EVERY 4 HOURS PRN
Qty: 30 TABLET | Refills: 0 | Status: SHIPPED | OUTPATIENT
Start: 2024-09-11 | End: 2024-09-14

## 2024-09-11 RX ORDER — LIDOCAINE HYDROCHLORIDE 10 MG/ML
1 INJECTION, SOLUTION INFILTRATION; PERINEURAL
Status: DISCONTINUED | OUTPATIENT
Start: 2024-09-11 | End: 2024-09-11 | Stop reason: HOSPADM

## 2024-09-11 RX ORDER — KETOROLAC TROMETHAMINE 30 MG/ML
INJECTION, SOLUTION INTRAMUSCULAR; INTRAVENOUS PRN
Status: DISCONTINUED | OUTPATIENT
Start: 2024-09-11 | End: 2024-09-11 | Stop reason: SDUPTHER

## 2024-09-11 RX ORDER — ACETAMINOPHEN 500 MG
1000 TABLET ORAL ONCE
Status: COMPLETED | OUTPATIENT
Start: 2024-09-11 | End: 2024-09-11

## 2024-09-11 RX ORDER — ONDANSETRON 2 MG/ML
INJECTION INTRAMUSCULAR; INTRAVENOUS PRN
Status: DISCONTINUED | OUTPATIENT
Start: 2024-09-11 | End: 2024-09-11 | Stop reason: SDUPTHER

## 2024-09-11 RX ORDER — SODIUM CHLORIDE 0.9 % (FLUSH) 0.9 %
5-40 SYRINGE (ML) INJECTION EVERY 12 HOURS SCHEDULED
Status: DISCONTINUED | OUTPATIENT
Start: 2024-09-11 | End: 2024-09-11 | Stop reason: HOSPADM

## 2024-09-11 RX ADMIN — ACETAMINOPHEN 1000 MG: 500 TABLET, FILM COATED ORAL at 07:43

## 2024-09-11 RX ADMIN — PHENYLEPHRINE HYDROCHLORIDE 200 MCG: 10 INJECTION INTRAVENOUS at 09:39

## 2024-09-11 RX ADMIN — Medication 2000 MG: at 08:47

## 2024-09-11 RX ADMIN — PHENYLEPHRINE HYDROCHLORIDE 200 MCG: 10 INJECTION INTRAVENOUS at 09:02

## 2024-09-11 RX ADMIN — PHENYLEPHRINE HYDROCHLORIDE 200 MCG: 10 INJECTION INTRAVENOUS at 08:48

## 2024-09-11 RX ADMIN — PHENYLEPHRINE HYDROCHLORIDE 100 MCG: 10 INJECTION INTRAVENOUS at 09:12

## 2024-09-11 RX ADMIN — SODIUM CHLORIDE, SODIUM LACTATE, POTASSIUM CHLORIDE, AND CALCIUM CHLORIDE: 600; 310; 30; 20 INJECTION, SOLUTION INTRAVENOUS at 07:40

## 2024-09-11 RX ADMIN — HYDROMORPHONE HYDROCHLORIDE 0.5 MG: 2 INJECTION INTRAMUSCULAR; INTRAVENOUS; SUBCUTANEOUS at 09:56

## 2024-09-11 RX ADMIN — PHENYLEPHRINE HYDROCHLORIDE 200 MCG: 10 INJECTION INTRAVENOUS at 08:58

## 2024-09-11 RX ADMIN — PHENYLEPHRINE HYDROCHLORIDE 200 MCG: 10 INJECTION INTRAVENOUS at 09:30

## 2024-09-11 RX ADMIN — PHENYLEPHRINE HYDROCHLORIDE 100 MCG: 10 INJECTION INTRAVENOUS at 09:04

## 2024-09-11 RX ADMIN — PHENYLEPHRINE HYDROCHLORIDE 200 MCG: 10 INJECTION INTRAVENOUS at 08:44

## 2024-09-11 RX ADMIN — OXYCODONE HYDROCHLORIDE 5 MG: 5 TABLET ORAL at 10:44

## 2024-09-11 RX ADMIN — DEXAMETHASONE SODIUM PHOSPHATE 4 MG: 4 INJECTION INTRA-ARTICULAR; INTRALESIONAL; INTRAMUSCULAR; INTRAVENOUS; SOFT TISSUE at 08:49

## 2024-09-11 RX ADMIN — PHENYLEPHRINE HYDROCHLORIDE 200 MCG: 10 INJECTION INTRAVENOUS at 09:07

## 2024-09-11 RX ADMIN — PROPOFOL 200 MG: 10 INJECTION, EMULSION INTRAVENOUS at 08:43

## 2024-09-11 RX ADMIN — HYDROMORPHONE HYDROCHLORIDE 0.5 MG: 2 INJECTION INTRAMUSCULAR; INTRAVENOUS; SUBCUTANEOUS at 10:24

## 2024-09-11 RX ADMIN — SODIUM CHLORIDE, SODIUM LACTATE, POTASSIUM CHLORIDE, AND CALCIUM CHLORIDE: 600; 310; 30; 20 INJECTION, SOLUTION INTRAVENOUS at 09:43

## 2024-09-11 RX ADMIN — HYDROMORPHONE HYDROCHLORIDE 0.5 MG: 2 INJECTION INTRAMUSCULAR; INTRAVENOUS; SUBCUTANEOUS at 10:34

## 2024-09-11 RX ADMIN — LIDOCAINE HYDROCHLORIDE 100 MG: 20 INJECTION, SOLUTION EPIDURAL; INFILTRATION; INTRACAUDAL; PERINEURAL at 08:43

## 2024-09-11 RX ADMIN — PROCHLORPERAZINE EDISYLATE 5 MG: 5 INJECTION INTRAMUSCULAR; INTRAVENOUS at 12:16

## 2024-09-11 RX ADMIN — PHENYLEPHRINE HYDROCHLORIDE 200 MCG: 10 INJECTION INTRAVENOUS at 09:34

## 2024-09-11 RX ADMIN — KETOROLAC TROMETHAMINE 30 MG: 30 INJECTION, SOLUTION INTRAMUSCULAR at 09:43

## 2024-09-11 RX ADMIN — ONDANSETRON 4 MG: 2 INJECTION INTRAMUSCULAR; INTRAVENOUS at 11:39

## 2024-09-11 RX ADMIN — ONDANSETRON 4 MG: 2 INJECTION INTRAMUSCULAR; INTRAVENOUS at 08:49

## 2024-09-11 RX ADMIN — PHENYLEPHRINE HYDROCHLORIDE 200 MCG: 10 INJECTION INTRAVENOUS at 09:19

## 2024-09-11 RX ADMIN — PHENYLEPHRINE HYDROCHLORIDE 100 MCG: 10 INJECTION INTRAVENOUS at 09:14

## 2024-09-11 ASSESSMENT — PAIN - FUNCTIONAL ASSESSMENT
PAIN_FUNCTIONAL_ASSESSMENT: PREVENTS OR INTERFERES SOME ACTIVE ACTIVITIES AND ADLS
PAIN_FUNCTIONAL_ASSESSMENT: NONE - DENIES PAIN

## 2024-09-11 ASSESSMENT — PAIN DESCRIPTION - ORIENTATION: ORIENTATION: RIGHT

## 2024-09-11 ASSESSMENT — PAIN SCALES - GENERAL
PAINLEVEL_OUTOF10: 6
PAINLEVEL_OUTOF10: 6

## 2024-09-11 ASSESSMENT — PAIN DESCRIPTION - LOCATION: LOCATION: ARM

## 2024-09-17 ENCOUNTER — HOSPITAL ENCOUNTER (OUTPATIENT)
Dept: PHYSICAL THERAPY | Age: 64
Setting detail: RECURRING SERIES
End: 2024-09-17
Attending: ORTHOPAEDIC SURGERY
Payer: COMMERCIAL

## 2024-09-19 ENCOUNTER — HOSPITAL ENCOUNTER (OUTPATIENT)
Dept: PHYSICAL THERAPY | Age: 64
Setting detail: RECURRING SERIES
Discharge: HOME | End: 2024-09-22
Attending: ORTHOPAEDIC SURGERY
Payer: COMMERCIAL

## 2024-09-19 PROCEDURE — 97110 THERAPEUTIC EXERCISES: CPT

## 2024-09-19 PROCEDURE — 97140 MANUAL THERAPY 1/> REGIONS: CPT

## 2024-09-19 ASSESSMENT — PAIN SCALES - GENERAL: PAINLEVEL_OUTOF10: 2

## 2024-09-19 ASSESSMENT — PAIN DESCRIPTION - ORIENTATION: ORIENTATION: LEFT

## 2024-09-19 ASSESSMENT — PAIN DESCRIPTION - LOCATION: LOCATION: SHOULDER

## 2024-09-26 ENCOUNTER — HOSPITAL ENCOUNTER (OUTPATIENT)
Dept: PHYSICAL THERAPY | Age: 64
Setting detail: RECURRING SERIES
Discharge: HOME | End: 2024-09-29
Attending: ORTHOPAEDIC SURGERY
Payer: COMMERCIAL

## 2024-09-26 ENCOUNTER — OFFICE VISIT (OUTPATIENT)
Dept: ORTHOPEDIC SURGERY | Age: 64
End: 2024-09-26

## 2024-09-26 DIAGNOSIS — S42.302A CLOSED FRACTURE OF SHAFT OF LEFT HUMERUS, UNSPECIFIED FRACTURE MORPHOLOGY, INITIAL ENCOUNTER: Primary | ICD-10-CM

## 2024-09-26 PROCEDURE — 99024 POSTOP FOLLOW-UP VISIT: CPT | Performed by: ORTHOPAEDIC SURGERY

## 2024-09-26 PROCEDURE — 97140 MANUAL THERAPY 1/> REGIONS: CPT

## 2024-09-26 PROCEDURE — 97110 THERAPEUTIC EXERCISES: CPT

## 2024-09-26 ASSESSMENT — PAIN DESCRIPTION - ORIENTATION: ORIENTATION: LEFT

## 2024-09-26 ASSESSMENT — PAIN DESCRIPTION - LOCATION: LOCATION: SHOULDER

## 2024-09-26 ASSESSMENT — PAIN SCALES - GENERAL: PAINLEVEL_OUTOF10: 2

## 2024-09-26 NOTE — PROGRESS NOTES
Mendel Ray McGill  : 1960  Primary: Christopher Nash (Gisela ALCALA)  Secondary:  [facility] @ [address]  [place] [address]  Phone: [phone]  Fax: [phone] Plan Frequency: 2 times per week  Plan of Care/Certification Expiration Date: 24        Plan of Care/Certification Expiration Date:  Plan of Care/Certification Expiration Date: 24    Frequency/Duration: Plan Frequency: 2 times per week     Time In/Out:   Time In: 1330  Time Out: 1410      PT Visit Info:    Total # of Visits Approved: 17  Progress Note Due Date: 10/19/24  Total # of Visits to Date: 48  Progress Note Counter: 2      Visit Count:  Visit count could not be calculated. Make sure you are using a visit which is associated with an episode.    OUTPATIENT PHYSICAL THERAPY:   Treatment Note 2024       Episode  (PT: Left humeral fracture)               Treatment Diagnosis:    Acute pain of left shoulder  Pain in left wrist  Medical/Referring Diagnosis:    Closed fracture of shaft of left humerus, unspecified fracture morphology, initial encounter [S42.302A]  Left wrist pain [M25.532]  Referring Physician:  Huy Matson MD MD Orders:   Evaluate & Treat:  -Left Shoulder: Full Active/Passive ROM and Full Strengthening;   -Left Wrist: Full Active/Passive ROM and Full Strengthening.  Return MD Appt:  TBD  Date of Onset:  Onset Date: 24 (Surgery: 2024)  Allergies:   Seasonal, Shellfish allergy, and Penicillins  Restrictions/Precautions:   No restrictions for L shoulder; strengthening restrictions for L wrist      Interventions Planned (Treatment may consist of any combination of the following):     See Assessment Note    Subjective Comments:   Patient reports he had the staples taken out of his arm today.  He reports he has some bruising starting from surgery.  He reports he is doing really well.    Initial Pain Level:: Left Shoulder 2/10   Post Session Pain Level:

## 2024-10-01 ENCOUNTER — HOSPITAL ENCOUNTER (OUTPATIENT)
Dept: PHYSICAL THERAPY | Age: 64
Setting detail: RECURRING SERIES
Discharge: HOME | End: 2024-10-04
Attending: ORTHOPAEDIC SURGERY
Payer: COMMERCIAL

## 2024-10-01 PROCEDURE — 97110 THERAPEUTIC EXERCISES: CPT

## 2024-10-01 PROCEDURE — 97140 MANUAL THERAPY 1/> REGIONS: CPT

## 2024-10-01 ASSESSMENT — PAIN DESCRIPTION - LOCATION: LOCATION: SHOULDER

## 2024-10-01 ASSESSMENT — PAIN SCALES - GENERAL: PAINLEVEL_OUTOF10: 2

## 2024-10-01 NOTE — PROGRESS NOTES
Mendel Ray McGill  : 1960  Primary: Christopher Nash (Gisela ALCALA)  Secondary:  [facility] @ [address]  [place] [address]  Phone: [phone]  Fax: [phone] Plan Frequency: 2 times per week  Plan of Care/Certification Expiration Date: 24        Plan of Care/Certification Expiration Date:  Plan of Care/Certification Expiration Date: 24    Frequency/Duration: Plan Frequency: 2 times per week     Time In/Out:   Time In: 0900  Time Out: 0940      PT Visit Info:    Total # of Visits Approved: 17  Progress Note Due Date: 10/19/24  Total # of Visits to Date: 49  Progress Note Counter: 3      Visit Count:  Visit count could not be calculated. Make sure you are using a visit which is associated with an episode.    OUTPATIENT PHYSICAL THERAPY:   Treatment Note 10/1/2024       Episode  (PT: Left humeral fracture)               Treatment Diagnosis:    Acute pain of left shoulder  Pain in left wrist  Medical/Referring Diagnosis:    Closed fracture of shaft of left humerus, unspecified fracture morphology, initial encounter [S42.302A]  Left wrist pain [M25.532]  Referring Physician:  Huy Matson MD MD Orders:   Evaluate & Treat:  -Left Shoulder: Full Active/Passive ROM and Full Strengthening;   -Left Wrist: Full Active/Passive ROM and Full Strengthening.  Return MD Appt:  TBD  Date of Onset:  Onset Date: 24 (Surgery: 2024)  Allergies:   Seasonal, Shellfish allergy, and Penicillins  Restrictions/Precautions:   No restrictions for L shoulder; strengthening restrictions for L wrist      Interventions Planned (Treatment may consist of any combination of the following):     See Assessment Note    Subjective Comments:   Patient reports he is doing ok, just sore.    Initial Pain Level:: Left Shoulder 2/10   Post Session Pain Level:  Left  Shoulder 2/10   Medications Last Reviewed:  10/1/2024  Updated Objective Findings:  None Today  Treatment

## 2024-10-03 ENCOUNTER — HOSPITAL ENCOUNTER (OUTPATIENT)
Dept: PHYSICAL THERAPY | Age: 64
Setting detail: RECURRING SERIES
Discharge: HOME | End: 2024-10-06
Attending: ORTHOPAEDIC SURGERY
Payer: COMMERCIAL

## 2024-10-03 PROCEDURE — 97110 THERAPEUTIC EXERCISES: CPT

## 2024-10-03 PROCEDURE — 97140 MANUAL THERAPY 1/> REGIONS: CPT

## 2024-10-03 ASSESSMENT — PAIN DESCRIPTION - LOCATION: LOCATION: SHOULDER

## 2024-10-03 ASSESSMENT — PAIN DESCRIPTION - ORIENTATION: ORIENTATION: LEFT

## 2024-10-03 ASSESSMENT — PAIN SCALES - GENERAL: PAINLEVEL_OUTOF10: 2

## 2024-10-03 NOTE — PROGRESS NOTES
Mendel Ray McGill  : 1960  Primary: Christopher Nash (Gisela ALCALA)  Secondary:  [facility] @ [address]  [place] [address]  Phone: [phone]  Fax: [phone] Plan Frequency: 2 times per week  Plan of Care/Certification Expiration Date: 24        Plan of Care/Certification Expiration Date:  Plan of Care/Certification Expiration Date: 24    Frequency/Duration: Plan Frequency: 2 times per week     Time In/Out:   Time In: 0900  Time Out: 0940      PT Visit Info:    Total # of Visits Approved: 17  Progress Note Due Date: 10/19/24  Total # of Visits to Date: 50  Progress Note Counter: 4      Visit Count:  17    OUTPATIENT PHYSICAL THERAPY:   Treatment Note 10/3/2024       Episode  (PT: Left humeral fracture)               Treatment Diagnosis:    Acute pain of left shoulder  Pain in left wrist  Medical/Referring Diagnosis:    Closed fracture of shaft of left humerus, unspecified fracture morphology, initial encounter [S42.302A]  Left wrist pain [M25.532]  Referring Physician:  Huy Matson MD MD Orders:   Evaluate & Treat:  -Left Shoulder: Full Active/Passive ROM and Full Strengthening;   -Left Wrist: Full Active/Passive ROM and Full Strengthening.  Return MD Appt:  TBD  Date of Onset:  Onset Date: 24 (Surgery: 2024)  Allergies:   Seasonal, Shellfish allergy, and Penicillins  Restrictions/Precautions:   No restrictions for L shoulder; strengthening restrictions for L wrist      Interventions Planned (Treatment may consist of any combination of the following):     See Assessment Note    Subjective Comments:   Patient reports he is sore in his shoulder.    Initial Pain Level:: Left Shoulder 2/10   Post Session Pain Level:  Left  Shoulder 2/10   Medications Last Reviewed:  10/3/2024  Updated Objective Findings:  None Today  Treatment   THERAPEUTIC EXERCISE: (30 minutes):    Exercises per grid below to improve mobility and strength.

## 2024-10-08 ENCOUNTER — HOSPITAL ENCOUNTER (OUTPATIENT)
Dept: PHYSICAL THERAPY | Age: 64
Setting detail: RECURRING SERIES
Discharge: HOME | End: 2024-10-11
Attending: ORTHOPAEDIC SURGERY
Payer: COMMERCIAL

## 2024-10-08 PROCEDURE — 97140 MANUAL THERAPY 1/> REGIONS: CPT

## 2024-10-08 PROCEDURE — 97110 THERAPEUTIC EXERCISES: CPT

## 2024-10-08 ASSESSMENT — PAIN DESCRIPTION - LOCATION: LOCATION: SHOULDER

## 2024-10-08 ASSESSMENT — PAIN SCALES - GENERAL: PAINLEVEL_OUTOF10: 2

## 2024-10-08 ASSESSMENT — PAIN DESCRIPTION - ORIENTATION: ORIENTATION: LEFT

## 2024-10-08 NOTE — PROGRESS NOTES
Mendel Ray McGill  : 1960  Primary: Christopher Nash (Gisela ALCALA)  Secondary:  [facility] [place]note 2[facility] @ [address]  [place] [address]  Phone: [phone]  Fax: [phone] Plan Frequency: 2 times per week  Plan of Care/Certification Expiration Date: 24        Plan of Care/Certification Expiration Date:  Plan of Care/Certification Expiration Date: 24    Frequency/Duration: Plan Frequency: 2 times per week     Time In/Out:   Time In: 0900  Time Out: 0940      PT Visit Info:    Total # of Visits Approved: 17  Progress Note Due Date: 10/19/24  Total # of Visits to Date: 51  Progress Note Counter: 5      Visit Count:  17    OUTPATIENT PHYSICAL THERAPY:   Treatment Note 10/8/2024       Episode  (PT: Left humeral fracture)               Treatment Diagnosis:    Acute pain of left shoulder  Pain in left wrist  Medical/Referring Diagnosis:    Closed fracture of shaft of left humerus, unspecified fracture morphology, initial encounter [S42.302A]  Left wrist pain [M25.532]  Referring Physician:  Huy Matson MD MD Orders:   Evaluate & Treat:  -Left Shoulder: Full Active/Passive ROM and Full Strengthening;   -Left Wrist: Full Active/Passive ROM and Full Strengthening.  Return MD Appt:  TBD  Date of Onset:  Onset Date: 24 (Surgery: 2024)  Allergies:   Seasonal, Shellfish allergy, and Penicillins  Restrictions/Precautions:   No restrictions for L shoulder; strengthening restrictions for L wrist      Interventions Planned (Treatment may consist of any combination of the following):     See Assessment Note    Subjective Comments:   Patient reports he is doing ok, just stiff and sore.    Initial Pain Level:: Left Shoulder 2/10   Post Session Pain Level:  Left  Shoulder 2/10   Medications Last Reviewed:  10/8/2024  Updated Objective Findings:  None Today  Treatment   THERAPEUTIC EXERCISE: (30 minutes):    Exercises per grid below to improve mobility and

## 2024-10-15 ENCOUNTER — OFFICE VISIT (OUTPATIENT)
Age: 64
End: 2024-10-15

## 2024-10-15 DIAGNOSIS — M77.8 EXTENSOR POLLICIS LONGUS TENDINITIS: ICD-10-CM

## 2024-10-15 DIAGNOSIS — M65.342 TRIGGER FINGER, LEFT RING FINGER: ICD-10-CM

## 2024-10-15 DIAGNOSIS — M65.341 TRIGGER FINGER, RIGHT RING FINGER: ICD-10-CM

## 2024-10-15 DIAGNOSIS — S62.101A CLOSED FRACTURE OF RIGHT WRIST, INITIAL ENCOUNTER: Primary | ICD-10-CM

## 2024-10-15 RX ORDER — BETAMETHASONE SODIUM PHOSPHATE AND BETAMETHASONE ACETATE 3; 3 MG/ML; MG/ML
6 INJECTION, SUSPENSION INTRA-ARTICULAR; INTRALESIONAL; INTRAMUSCULAR; SOFT TISSUE ONCE
Status: COMPLETED | OUTPATIENT
Start: 2024-10-15 | End: 2024-10-15

## 2024-10-15 RX ADMIN — BETAMETHASONE SODIUM PHOSPHATE AND BETAMETHASONE ACETATE 6 MG: 3; 3 INJECTION, SUSPENSION INTRA-ARTICULAR; INTRALESIONAL; INTRAMUSCULAR; SOFT TISSUE at 08:57

## 2024-10-15 NOTE — PROGRESS NOTES
[facility] Note    Name: Mendel Ray McGill  Age: 63 y.o.  YOB: 1960  Gender: male  MRN: [id number]      Follow up visit:   1. Closed fracture of right wrist, initial encounter    2. Extensor pollicis longus tendinitis    3. Trigger finger, right ring finger    4. Trigger finger, left ring finger        HPI: Mendel Ray McGill is a 63 y.o. male who is following up for bilateral wrist fracture, it has been 9 months since the injury on the right side, he continues to have pain, mostly on the ulnar side.  On the left side he had a EPL tendon rupture but he is fairly well compensated and happy with outcome on the left.      ROS/Meds/PSH/PMH/FH/SH: I personally reviewed the patients standard intake form.  Pertinents are discussed in the HPI    Physical Examination:  General: Awake and alert.  HEENT: Normocephalic, atraumatic  CV/Pulm: Breathing even and unlabored  Skin: No obvious rashes noted.  Lymphatic: No obvious evidence of lymphedema or lymphadenopathy    Musculoskeletal Examination:  Examination on the right upper extremity demonstrates cap refill < 5 seconds in all fingers, no significant tenderness ovation of the distal radius, very minimal at the radiocarpal joint, moderate to severe tenderness palpation of the TFCC fovea.    Imaging / Electrodiagnostic Tests:     Previous x-ray of the right wrist was reviewed, this demonstrates a healed distal radius fracture with 45 degrees of dorsal tilt 3 mm ulnar positive variance    Assessment:   1. Closed fracture of right wrist, initial encounter    2. Extensor pollicis longus tendinitis    3. Trigger finger, right ring finger    4. Trigger finger, left ring finger        Plan:   We discussed the diagnosis and different treatment options. We discussed observation, therapy, antiinflammatory medications and other pertinent treatment modalities.    After discussing in detail the patient elects to proceed with right ulnocarpal joint steroid

## 2024-10-29 ENCOUNTER — HOSPITAL ENCOUNTER (OUTPATIENT)
Dept: PHYSICAL THERAPY | Age: 64
Setting detail: RECURRING SERIES
Discharge: HOME | End: 2024-11-01
Attending: ORTHOPAEDIC SURGERY
Payer: COMMERCIAL

## 2024-10-29 PROCEDURE — 97110 THERAPEUTIC EXERCISES: CPT

## 2024-10-29 PROCEDURE — 97140 MANUAL THERAPY 1/> REGIONS: CPT

## 2024-10-29 ASSESSMENT — PAIN DESCRIPTION - ORIENTATION: ORIENTATION: LEFT

## 2024-10-29 ASSESSMENT — PAIN DESCRIPTION - LOCATION: LOCATION: SHOULDER

## 2024-10-29 ASSESSMENT — PAIN SCALES - GENERAL: PAINLEVEL_OUTOF10: 2

## 2024-10-29 NOTE — PROGRESS NOTES
Mendel Ray McGill  : 1960  Primary: Christopher Nash (Gisela ALCALA)  Secondary:  [facility] @ [address]  [place] [address]  Phone: [phone]  Fax: [phone] Plan Frequency: 2 times per week  Plan of Care/Certification Expiration Date: 24        Plan of Care/Certification Expiration Date:  Plan of Care/Certification Expiration Date: 24    Frequency/Duration: Plan Frequency: 2 times per week     Time In/Out:   Time In: 1645  Time Out: 1725      PT Visit Info:    Total # of Visits Approved: 17  Progress Note Due Date: 10/19/24  Total # of Visits to Date: 52  Progress Note Counter: 6      Visit Count:  17    OUTPATIENT PHYSICAL THERAPY:   Treatment Note 10/29/2024       Episode  (PT: Left humeral fracture)               Treatment Diagnosis:    Acute pain of left shoulder  Pain in left wrist  Medical/Referring Diagnosis:    Closed fracture of shaft of left humerus, unspecified fracture morphology, initial encounter [S42.302A]  Left wrist pain [M25.532]  Referring Physician:  Huy Matson MD MD Orders:   Evaluate & Treat:  -Left Shoulder: Full Active/Passive ROM and Full Strengthening;   -Left Wrist: Full Active/Passive ROM and Full Strengthening.  Return MD Appt:  TBD  Date of Onset:  Onset Date: 24 (Surgery: 2024)  Allergies:   Seasonal, Shellfish allergy, and Penicillins  Restrictions/Precautions:   No restrictions for L shoulder; strengthening restrictions for L wrist      Interventions Planned (Treatment may consist of any combination of the following):     See Assessment Note    Subjective Comments:   Patient reports he feels tight in his shoulder.    Initial Pain Level:: Left Shoulder 2/10   Post Session Pain Level:  Left  Shoulder 2/10   Medications Last Reviewed:  10/29/2024  Updated Objective Findings:  None Today  Treatment   THERAPEUTIC EXERCISE: (30 minutes):    Exercises per grid below to improve mobility and strength.

## 2024-10-31 ENCOUNTER — HOSPITAL ENCOUNTER (OUTPATIENT)
Dept: PHYSICAL THERAPY | Age: 64
Setting detail: RECURRING SERIES
Discharge: HOME | End: 2024-11-03
Attending: ORTHOPAEDIC SURGERY
Payer: COMMERCIAL

## 2024-10-31 PROCEDURE — 97110 THERAPEUTIC EXERCISES: CPT

## 2024-10-31 PROCEDURE — 97140 MANUAL THERAPY 1/> REGIONS: CPT

## 2024-10-31 ASSESSMENT — PAIN SCALES - GENERAL: PAINLEVEL_OUTOF10: 2

## 2024-10-31 ASSESSMENT — PAIN DESCRIPTION - ORIENTATION: ORIENTATION: LEFT

## 2024-10-31 ASSESSMENT — PAIN DESCRIPTION - LOCATION: LOCATION: SHOULDER

## 2024-10-31 NOTE — PROGRESS NOTES
11/26/2024 10:30 AM Brittani Barba PT [facility]   11/27/2024 10:30 AM Brittani Barba PT [facility]        
attended a total of 53 scheduled physical therapy visits including initial evaluation on 2/15/2024.  Treatment has consisted of manual therapy, mobility and strengthening to improve overall pain and performance with activities of daily living.      Therapy Problem List: (Impacting functional limitations):    Increased Pain, Decreased Strength, Decreased ROM, Decreased Functional Mobility, Decreased [place] with Home Exercise Program, Decreased Posture, Decreased Body Mechanics, and Decreased Activity Tolerance/Endurance*   Therapy Prognosis:   Good       PLAN   Effective Dates: 9/19/2024 TO Plan of Care/Certification Expiration Date: 12/18/24     Frequency/Duration: Plan Frequency: 2 times per week      Interventions Planned (Treatment may consist of any combination of the following):    Home Exercise Program (HEP), Manual Therapy, Neuromuscular Re-education/Strengthening, Modalities: and   Heat/Cold, Range of Motion (ROM), Therapeutic Activites, and Therapeutic Exercise/Strengthening   Goals: (Goals have been discussed and agreed upon with patient.)  Short-Term Functional Goals: Time Frame: 30 days  Patient will be independent with home exercise program without exacerbation of symptoms or cueing needed--goal met.   Patient will be independent with correct sleeping positions and awareness/avoidance of aggravating positions without cueing needed--goal met.    Discharge Goals: Time Frame: 90 days  Patient will be independent with all ADLs with minimal onset of  left shoulder/wrist pain and no deficits with daily tasks--goal ongoing.  Patient will report no fear avoidance with social or recreational activities due to  left shoulder/wrist pain--goal ongoing.  Patient will score less than or equal to 10/55 on Quick DASH with minimal effect of  left shoulder/wrist pain on patient's ability to manage every day life activities--goal ongoing.        Outcome Measure:   Tool Used: Disabilities of the Arm, Shoulder and

## 2024-11-12 ENCOUNTER — HOSPITAL ENCOUNTER (OUTPATIENT)
Dept: PHYSICAL THERAPY | Age: 64
Setting detail: RECURRING SERIES
Discharge: HOME | End: 2024-11-15
Attending: ORTHOPAEDIC SURGERY
Payer: COMMERCIAL

## 2024-11-12 PROCEDURE — 97140 MANUAL THERAPY 1/> REGIONS: CPT

## 2024-11-12 PROCEDURE — 97110 THERAPEUTIC EXERCISES: CPT

## 2024-11-12 ASSESSMENT — PAIN DESCRIPTION - ORIENTATION: ORIENTATION: LEFT

## 2024-11-12 ASSESSMENT — PAIN SCALES - GENERAL: PAINLEVEL_OUTOF10: 2

## 2024-11-12 ASSESSMENT — PAIN DESCRIPTION - LOCATION: LOCATION: SHOULDER

## 2024-11-12 NOTE — PROGRESS NOTES
Mendel Ray McGill  : 1960  Primary: Christopher Nash (Gisela ALCALA)  Secondary:  [facility] @ [address]  [place] [address]  Phone: [phone]  Fax: [phone] Plan Frequency: 2 times per week  Plan of Care/Certification Expiration Date: 24        Plan of Care/Certification Expiration Date:  Plan of Care/Certification Expiration Date: 24    Frequency/Duration: Plan Frequency: 2 times per week     Time In/Out:   Time In: 1115  Time Out: 1155      PT Visit Info:    Total # of Visits Approved: 17  Progress Note Due Date: 24  Total # of Visits to Date: 54  Progress Note Counter: 2      Visit Count:  17    OUTPATIENT PHYSICAL THERAPY:   Treatment Note 2024       Episode  (PT: Left humeral fracture)               Treatment Diagnosis:    Acute pain of left shoulder  Pain in left wrist  Medical/Referring Diagnosis:    Closed fracture of shaft of left humerus, unspecified fracture morphology, initial encounter [S42.302A]  Left wrist pain [M25.532]  Referring Physician:  Huy Matson MD MD Orders:   Evaluate & Treat:  -Left Shoulder: Full Active/Passive ROM and Full Strengthening;   -Left Wrist: Full Active/Passive ROM and Full Strengthening.  Return MD Appt:  TBD  Date of Onset:  Onset Date: 24 (Surgery: 2024)  Allergies:   Seasonal, Shellfish allergy, and Penicillins  Restrictions/Precautions:   No restrictions for L shoulder; strengthening restrictions for L wrist      Interventions Planned (Treatment may consist of any combination of the following):     See Assessment Note    Subjective Comments:   Patient reports he is stiff in the morning.    Initial Pain Level:: Left Shoulder 2/10   Post Session Pain Level:  Left  Shoulder 2/10   Medications Last Reviewed:  2024  Updated Objective Findings:  None Today  Treatment   THERAPEUTIC EXERCISE: (30 minutes):    Exercises per grid below to improve mobility and strength.

## 2024-11-13 ENCOUNTER — HOSPITAL ENCOUNTER (OUTPATIENT)
Dept: PHYSICAL THERAPY | Age: 64
Setting detail: RECURRING SERIES
Discharge: HOME | End: 2024-11-16
Attending: ORTHOPAEDIC SURGERY
Payer: COMMERCIAL

## 2024-11-13 PROCEDURE — 97110 THERAPEUTIC EXERCISES: CPT

## 2024-11-13 PROCEDURE — 97140 MANUAL THERAPY 1/> REGIONS: CPT

## 2024-11-13 ASSESSMENT — PAIN DESCRIPTION - ORIENTATION: ORIENTATION: LEFT

## 2024-11-13 ASSESSMENT — PAIN SCALES - GENERAL: PAINLEVEL_OUTOF10: 2

## 2024-11-13 ASSESSMENT — PAIN DESCRIPTION - LOCATION: LOCATION: SHOULDER

## 2024-11-13 NOTE — PROGRESS NOTES
Mendel Ray McGill  : 1960  Primary: Christopher Nash (Gisela ALCALA)  Secondary:  [facility] @ [address]  [place] [address]  Phone: [phone]  Fax: [phone] Plan Frequency: 2 times per week  Plan of Care/Certification Expiration Date: 24        Plan of Care/Certification Expiration Date:  Plan of Care/Certification Expiration Date: 24    Frequency/Duration: Plan Frequency: 2 times per week     Time In/Out:   Time In: 0900  Time Out: 0940      PT Visit Info:    Total # of Visits Approved: 17  Progress Note Due Date: 24  Total # of Visits to Date: 55  Progress Note Counter: 3      Visit Count:  17    OUTPATIENT PHYSICAL THERAPY:   Treatment Note 2024       Episode  (PT: Left humeral fracture)               Treatment Diagnosis:    Acute pain of left shoulder  Pain in left wrist  Medical/Referring Diagnosis:    Closed fracture of shaft of left humerus, unspecified fracture morphology, initial encounter [S42.302A]  Left wrist pain [M25.532]  Referring Physician:  Huy Matson MD MD Orders:   Evaluate & Treat:  -Left Shoulder: Full Active/Passive ROM and Full Strengthening;   -Left Wrist: Full Active/Passive ROM and Full Strengthening.  Return MD Appt:  TBD  Date of Onset:  Onset Date: 24 (Surgery: 2024)  Allergies:   Seasonal, Shellfish allergy, and Penicillins  Restrictions/Precautions:   No restrictions for L shoulder; strengthening restrictions for L wrist      Interventions Planned (Treatment may consist of any combination of the following):     See Assessment Note    Subjective Comments:   Patient reports he is doing ok today.    Initial Pain Level:: Left Shoulder 2/10   Post Session Pain Level:  Left  Shoulder 2/10   Medications Last Reviewed:  2024  Updated Objective Findings:  None Today  Treatment   THERAPEUTIC EXERCISE: (30 minutes):    Exercises per grid below to improve mobility and strength.  Required

## 2024-11-20 ENCOUNTER — HOSPITAL ENCOUNTER (OUTPATIENT)
Dept: PHYSICAL THERAPY | Age: 64
Setting detail: RECURRING SERIES
Discharge: HOME | End: 2024-11-23
Attending: ORTHOPAEDIC SURGERY
Payer: COMMERCIAL

## 2024-11-20 PROCEDURE — 97110 THERAPEUTIC EXERCISES: CPT

## 2024-11-20 PROCEDURE — 97140 MANUAL THERAPY 1/> REGIONS: CPT

## 2024-11-20 ASSESSMENT — PAIN DESCRIPTION - ORIENTATION: ORIENTATION: LEFT

## 2024-11-20 ASSESSMENT — PAIN DESCRIPTION - LOCATION: LOCATION: SHOULDER

## 2024-11-20 ASSESSMENT — PAIN SCALES - GENERAL: PAINLEVEL_OUTOF10: 2

## 2024-11-20 NOTE — PROGRESS NOTES
Mendel Ray McGill  : 1960  Primary: Christopher Nash Local (Gisela ALCALA)  Secondary:  [facility] @ [address]  [place] [address]  Phone: [phone]  Fax: [phone] Plan Frequency: 2 times per week  Plan of Care/Certification Expiration Date: 24        Plan of Care/Certification Expiration Date:  Plan of Care/Certification Expiration Date: 24    Frequency/Duration: Plan Frequency: 2 times per week     Time In/Out:   Time In: 1345  Time Out: 1425      PT Visit Info:    Total # of Visits Approved: 17  Progress Note Due Date: 24  Total # of Visits to Date: 56  Progress Note Counter: 4      Visit Count:  17    OUTPATIENT PHYSICAL THERAPY:   Treatment Note 2024       Episode  (PT: Left humeral fracture)               Treatment Diagnosis:    Acute pain of left shoulder  Pain in left wrist  Medical/Referring Diagnosis:    Closed fracture of shaft of left humerus, unspecified fracture morphology, initial encounter [S42.302A]  Left wrist pain [M25.532]  Referring Physician:  Huy Matson MD MD Orders:   Evaluate & Treat:  -Left Shoulder: Full Active/Passive ROM and Full Strengthening;   -Left Wrist: Full Active/Passive ROM and Full Strengthening.  Return MD Appt:  TBD  Date of Onset:  Onset Date: 24 (Surgery: 2024)  Allergies:   Seasonal, Shellfish allergy, and Penicillins  Restrictions/Precautions:   No restrictions for L shoulder; strengthening restrictions for L wrist      Interventions Planned (Treatment may consist of any combination of the following):     See Assessment Note    Subjective Comments:   Patient reports he is tight today in his shoulders.    Initial Pain Level:: Left Shoulder 2/10   Post Session Pain Level:  Left  Shoulder 2/10   Medications Last Reviewed:  2024  Updated Objective Findings:  None Today  Treatment   THERAPEUTIC EXERCISE: (30 minutes):    Exercises per grid below to improve mobility and

## 2024-11-22 ENCOUNTER — HOSPITAL ENCOUNTER (OUTPATIENT)
Dept: PHYSICAL THERAPY | Age: 64
Setting detail: RECURRING SERIES
Discharge: HOME | End: 2024-11-25
Attending: ORTHOPAEDIC SURGERY
Payer: COMMERCIAL

## 2024-11-22 PROCEDURE — 97110 THERAPEUTIC EXERCISES: CPT

## 2024-11-22 PROCEDURE — 97140 MANUAL THERAPY 1/> REGIONS: CPT

## 2024-11-22 ASSESSMENT — PAIN DESCRIPTION - ORIENTATION: ORIENTATION: LEFT

## 2024-11-22 ASSESSMENT — PAIN DESCRIPTION - LOCATION: LOCATION: SHOULDER

## 2024-11-22 ASSESSMENT — PAIN SCALES - GENERAL: PAINLEVEL_OUTOF10: 2

## 2024-11-22 NOTE — PROGRESS NOTES
Mendel Ray McGill  : 1960  Primary: Christopher Nash Local (Gisela ALCALA)  Secondary:  [facility] @ [address]  [place] [address]  Phone: [phone]  Fax: [phone] Plan Frequency: 2 times per week  Plan of Care/Certification Expiration Date: 24        Plan of Care/Certification Expiration Date:  Plan of Care/Certification Expiration Date: 24    Frequency/Duration: Plan Frequency: 2 times per week     Time In/Out:   Time In: 0945  Time Out: 1025      PT Visit Info:    Total # of Visits Approved: 17  Progress Note Due Date: 24  Total # of Visits to Date: 57  Progress Note Counter: 5      Visit Count:  17    OUTPATIENT PHYSICAL THERAPY:   Treatment Note 2024       Episode  (PT: Left humeral fracture)               Treatment Diagnosis:    Acute pain of left shoulder  Pain in left wrist  Medical/Referring Diagnosis:    Closed fracture of shaft of left humerus, unspecified fracture morphology, initial encounter [S42.302A]  Left wrist pain [M25.532]  Referring Physician:  Huy Matson MD MD Orders:   Evaluate & Treat:  -Left Shoulder: Full Active/Passive ROM and Full Strengthening;   -Left Wrist: Full Active/Passive ROM and Full Strengthening.  Return MD Appt:  TBD  Date of Onset:  Onset Date: 24 (Surgery: 2024)  Allergies:   Seasonal, Shellfish allergy, and Penicillins  Restrictions/Precautions:   No restrictions for L shoulder; strengthening restrictions for L wrist      Interventions Planned (Treatment may consist of any combination of the following):     See Assessment Note    Subjective Comments:   Patient reports he is doing well, just stiff this morning.    Initial Pain Level:: Left Shoulder 2/10   Post Session Pain Level:  Left  Shoulder 2/10   Medications Last Reviewed:  2024  Updated Objective Findings:  None Today  Treatment   THERAPEUTIC EXERCISE: (30 minutes):    Exercises per grid below to improve

## 2024-11-26 ENCOUNTER — HOSPITAL ENCOUNTER (OUTPATIENT)
Dept: PHYSICAL THERAPY | Age: 64
Setting detail: RECURRING SERIES
Discharge: HOME | End: 2024-11-29
Attending: ORTHOPAEDIC SURGERY
Payer: COMMERCIAL

## 2024-11-26 PROCEDURE — 97140 MANUAL THERAPY 1/> REGIONS: CPT

## 2024-11-26 PROCEDURE — 97110 THERAPEUTIC EXERCISES: CPT

## 2024-11-26 ASSESSMENT — PAIN DESCRIPTION - ORIENTATION: ORIENTATION: LEFT

## 2024-11-26 ASSESSMENT — PAIN SCALES - GENERAL: PAINLEVEL_OUTOF10: 2

## 2024-11-26 ASSESSMENT — PAIN DESCRIPTION - LOCATION: LOCATION: SHOULDER

## 2024-11-26 NOTE — PROGRESS NOTES
Mendel Ray McGill  : 1960  Primary: Bcrajani Nash Local (Gisela ALCALA)  Secondary:  [facility] @ [address]  [place] [address]  Phone: [phone]  Fax: [phone] Plan Frequency: 2 times per week  Plan of Care/Certification Expiration Date: 24        Plan of Care/Certification Expiration Date:  Plan of Care/Certification Expiration Date: 24    Frequency/Duration: Plan Frequency: 2 times per week     Time In/Out:   Time In: 1030  Time Out: 1110      PT Visit Info:    Total # of Visits Approved: 17  Progress Note Due Date: 24  Total # of Visits to Date: 58  Progress Note Counter: 6      Visit Count:  17    OUTPATIENT PHYSICAL THERAPY:   Treatment Note 2024       Episode  (PT: Left humeral fracture)               Treatment Diagnosis:    Acute pain of left shoulder  Pain in left wrist  Medical/Referring Diagnosis:    Closed fracture of shaft of left humerus, unspecified fracture morphology, initial encounter [S42.302A]  Left wrist pain [M25.532]  Referring Physician:  Huy Matson MD MD Orders:   Evaluate & Treat:  -Left Shoulder: Full Active/Passive ROM and Full Strengthening;   -Left Wrist: Full Active/Passive ROM and Full Strengthening.  Return MD Appt:  TBD  Date of Onset:  Onset Date: 24 (Surgery: 2024)  Allergies:   Seasonal, Shellfish allergy, and Penicillins  Restrictions/Precautions:   No restrictions for L shoulder; strengthening restrictions for L wrist      Interventions Planned (Treatment may consist of any combination of the following):     See Assessment Note    Subjective Comments:   Patient reports he is doing ok.    Initial Pain Level:: Left Shoulder 2/10   Post Session Pain Level:  Left  Shoulder 2/10   Medications Last Reviewed:  2024  Updated Objective Findings:  None Today  Treatment   THERAPEUTIC EXERCISE: (30 minutes):    Exercises per grid below to improve mobility and strength.  Required

## 2024-11-27 ENCOUNTER — HOSPITAL ENCOUNTER (OUTPATIENT)
Dept: PHYSICAL THERAPY | Age: 64
Setting detail: RECURRING SERIES
Discharge: HOME | End: 2024-11-30
Attending: ORTHOPAEDIC SURGERY
Payer: COMMERCIAL

## 2024-11-27 PROCEDURE — 97110 THERAPEUTIC EXERCISES: CPT

## 2024-11-27 PROCEDURE — 97140 MANUAL THERAPY 1/> REGIONS: CPT

## 2024-11-27 ASSESSMENT — PAIN DESCRIPTION - ORIENTATION: ORIENTATION: LEFT

## 2024-11-27 ASSESSMENT — PAIN DESCRIPTION - LOCATION: LOCATION: SHOULDER

## 2024-11-27 ASSESSMENT — PAIN SCALES - GENERAL: PAINLEVEL_OUTOF10: 2

## 2024-11-27 NOTE — PROGRESS NOTES
Mendel Ray McGill  : 1960  Primary: Christopher Nash Local (Gisela ALCALA)  Secondary:  [facility] @ [address]  [place] [address]  Phone: [phone]  Fax: [phone] Plan Frequency: 2 times per week  Plan of Care/Certification Expiration Date: 24        Plan of Care/Certification Expiration Date:  Plan of Care/Certification Expiration Date: 24    Frequency/Duration: Plan Frequency: 2 times per week     Time In/Out:   Time In: 1030  Time Out: 1110      PT Visit Info:    Total # of Visits Approved: 17  Progress Note Due Date: 24  Total # of Visits to Date: 59  Progress Note Counter: 1       OUTPATIENT PHYSICAL THERAPY:   Treatment Note 2024       Episode  (PT: Left humeral fracture)               Treatment Diagnosis:    Acute pain of left shoulder  Pain in left wrist  Medical/Referring Diagnosis:    Closed fracture of shaft of left humerus, unspecified fracture morphology, initial encounter [S42.302A]  Left wrist pain [M25.532]  Referring Physician:  Huy Matson MD MD Orders:   Evaluate & Treat:  -Left Shoulder: Full Active/Passive ROM and Full Strengthening;   -Left Wrist: Full Active/Passive ROM and Full Strengthening.  Return MD Appt:  TBD  Date of Onset:  Onset Date: 24 (Surgery: 2024)  Allergies:   Seasonal, Shellfish allergy, and Penicillins  Restrictions/Precautions:   No restrictions for L shoulder; strengthening restrictions for L wrist      Interventions Planned (Treatment may consist of any combination of the following):     See Assessment Note    Subjective Comments:   Patient reports he is doing ok today.    Initial Pain Level:: Left Shoulder 2/10   Post Session Pain Level:  Left  Shoulder 2/10   Medications Last Reviewed:  2024  Updated Objective Findings:  None Today  Treatment   THERAPEUTIC EXERCISE: (30 minutes):    Exercises per grid below to improve mobility and strength.  Required minimal 
Shoulder and Hand (DASH) Questionnaire - Quick Version  Score:  Initial: 40/55  Most Recent: 20/55 (Date: 11/27/2024 )   Interpretation of Score: The DASH is designed to measure the activities of daily living in person's with upper extremity dysfunction or pain.  Each section is scored on a 1-5 scale, 5 representing the greatest disability.  The scores of each section are added together for a total score of 55.      Medical Necessity:   > Patient is expected to demonstrate progress in strength and range of motion to improve safety during daily activities.  > Patient demonstrates good rehab potential due to higher previous functional level.  > Skilled intervention continues to be required due to decreased functional mobility with daily activities.  Reason For Services/Other Comments:  > Patient continues to demonstrate capacity to improve overall functional mobility which will increase independence and increase safety.      Regarding Mendel Ray McGill's therapy, I certify that the treatment plan above will be carried out by a therapist or under their direction.  Thank you for this referral,  CHARAN AVENDAÑO PT     Referring Physician Signature: Huy Matson MD No Signature is Required for this note.        Charge Capture  Appt Desk

## 2024-12-04 ENCOUNTER — HOSPITAL ENCOUNTER (OUTPATIENT)
Dept: PHYSICAL THERAPY | Age: 64
Setting detail: RECURRING SERIES
Discharge: HOME | End: 2024-12-07
Attending: ORTHOPAEDIC SURGERY
Payer: MEDICARE

## 2024-12-04 PROCEDURE — 97140 MANUAL THERAPY 1/> REGIONS: CPT

## 2024-12-04 PROCEDURE — 97110 THERAPEUTIC EXERCISES: CPT

## 2024-12-04 ASSESSMENT — PAIN SCALES - GENERAL: PAINLEVEL_OUTOF10: 5

## 2024-12-04 NOTE — PROGRESS NOTES
manual cues to promote proper body alignment, promote proper body posture, and promote proper body mechanics.  Progressed resistance, range, repetitions, and complexity of movement as indicated.   Date:  11/27/2024 Date:  12-04-24   Activity/Exercise Parameters    UBE 6 minutes  Level 4 Level 4  6 minutes   Nautilus rows 65 pounds  20 reps  2 sets 65 pounds  20 reps  2 sets   Nautilus chest press 50 pounds  15 reps  2 sets 50 pounds  15 reps  2 sets   Bicep curls 10 pounds  20 reps  B UE 10 pounds  20 reps  B UE's   Bent over rows 10 pounds  15 reps  B UE 10 pounds  20 reps  B UE's   Bent over \"T\" 3 pounds  15 reps  L UE 3 pounds  15 reps  L UE   Bent over \"Y\" 3 pounds  15 reps  L UE 3 pounds  15 reps  L UE   Bent over \"I\" 3 pounds  15 reps  L UE 3 pounds  15 reps  L UE   Sidelying shoulder abduction 5 pounds  15 reps  L UE 5 pounds  15 reps  L UE   Sidelying shoulder external rotation 5 pounds  15 reps  L UE 5 pounds  15 reps  L UE   Sidelying shoulder horizontal abduction/adduction 3 pounds  15 reps  L UE 3 pounds  15 reps  L UE   Theraband Internal rotation Green t-band  20 reps Green T-band  20 reps   Theraband external rotation Green t-band  15 reps Green T-band  20 reps   Supine Shoulder Flexion  3 pounds  15 reps  L UE      MANUAL THERAPY: (10 minutes - L UE):   Joint mobilization and Soft tissue mobilization was utilized and necessary because of the patient's restricted joint motion and restricted motion of soft tissue. (Upper trap, lower trap, rhomboids, first rib, pectoralis major, and pectoralis minor; PROM in all planes, pushing motion; grade II-IV joint mobs as tolerated to glenohumeral joint..)    Treatment/Session Summary:    Treatment Assessment:   Patient tolerated treatment well with mild c/o L shoulder tightness.  Communication/Consultation:  None today  Equipment provided today:  None  Recommendations/Intent for next treatment session: Next visit will focus on improving overall mobility,

## 2024-12-10 ENCOUNTER — HOSPITAL ENCOUNTER (OUTPATIENT)
Dept: PHYSICAL THERAPY | Age: 64
Setting detail: RECURRING SERIES
Discharge: HOME | End: 2024-12-13
Attending: ORTHOPAEDIC SURGERY
Payer: MEDICARE

## 2024-12-10 PROCEDURE — 97110 THERAPEUTIC EXERCISES: CPT

## 2024-12-10 PROCEDURE — 97140 MANUAL THERAPY 1/> REGIONS: CPT

## 2024-12-10 ASSESSMENT — PAIN DESCRIPTION - ORIENTATION: ORIENTATION: LEFT

## 2024-12-10 ASSESSMENT — PAIN SCALES - GENERAL: PAINLEVEL_OUTOF10: 4

## 2024-12-10 ASSESSMENT — PAIN DESCRIPTION - LOCATION: LOCATION: SHOULDER

## 2024-12-10 NOTE — PROGRESS NOTES
Brittani Barba, PT [facility]   12/19/2024  9:45 AM Brittani Barba, PT [facility]   12/26/2024  9:45 AM Brittani Barba, PT [facility]   12/27/2024 10:30 AM Brittani Barba, PT [facility]   12/31/2024  9:45 AM Brittani Barba, PT [facility]

## 2024-12-12 ENCOUNTER — HOSPITAL ENCOUNTER (OUTPATIENT)
Dept: PHYSICAL THERAPY | Age: 64
Setting detail: RECURRING SERIES
Discharge: HOME | End: 2024-12-15
Attending: ORTHOPAEDIC SURGERY
Payer: MEDICARE

## 2024-12-12 PROCEDURE — 97110 THERAPEUTIC EXERCISES: CPT

## 2024-12-12 PROCEDURE — 97140 MANUAL THERAPY 1/> REGIONS: CPT

## 2024-12-12 ASSESSMENT — PAIN DESCRIPTION - LOCATION: LOCATION: SHOULDER

## 2024-12-12 ASSESSMENT — PAIN SCALES - GENERAL: PAINLEVEL_OUTOF10: 3

## 2024-12-12 ASSESSMENT — PAIN DESCRIPTION - ORIENTATION: ORIENTATION: LEFT

## 2024-12-12 NOTE — THERAPY RECERTIFICATION
Mendel Ray McGill  : 1960  Primary: Medicare Part A And B ([employer] [employer])  Secondary:  [facility] @ [address]  [place] [address]  Phone: [phone]  Fax: [phone] Plan Frequency: 2 times per week  Plan of Care/Certification Expiration Date: 25        Plan of Care/Certification Expiration Date:  Plan of Care/Certification Expiration Date: 25    Frequency/Duration: Plan Frequency: 2 times per week      Time In/Out:   Time In: 0945  Time Out: 1025      PT Visit Info:    Total # of Visits Approved: 17  Progress Note Due Date: 25  Total # of Visits to Date: 62  Progress Note Counter: 1                   OUTPATIENT PHYSICAL THERAPY:             Recertification 2024               Episode (PT: Left humeral fracture)         Treatment Diagnosis:     Acute pain of left shoulder  Pain in left wrist  Medical/Referring Diagnosis:    Closed fracture of shaft of left humerus, unspecified fracture morphology, initial encounter [S42.302A]  Left wrist pain [M25.532]  Referring Physician:  Huy Matson MD MD Orders:  Evaluate & Treat:  -Left Shoulder: Full Active/Passive ROM and Full Strengthening;   -Left Wrist: Full Active/Gentle Passive ROM and NO Strenghtening.   Return MD Appt:  2024  Date of Onset:  Onset Date: 24 (Surgery: 2024)  Allergies:  Seasonal, Shellfish allergy, and Penicillins  Restrictions/Precautions:    No restrictions for L shoulder; strengthening restrictions for L wrist      Medications Last Reviewed:  2024     SUBJECTIVE   History of Injury/Illness (Reason for Referral):  2024 (Progress Note): Patient reports that he is doing ok. He reports that his shoulder still clicks and pops when he moves it in certain directions.  He reports that overall he's doing well.    8/15/2024 (Recertification): Patient reports his shoulder still has popping when he moves it

## 2024-12-12 NOTE — PROGRESS NOTES
Mendel Ray McGill  : 1960  Primary: Medicare Part A And B ([employer]isela BCBS)  Secondary:  [facility] @ [address]  [place] [address]  Phone: [phone]  Fax: [phone] Plan Frequency: 2 times per week  Plan of Care/Certification Expiration Date: 25        Plan of Care/Certification Expiration Date:  Plan of Care/Certification Expiration Date: 25    Frequency/Duration: Plan Frequency: 2 times per week     Time In/Out:   Time In: 0945  Time Out: 1025      PT Visit Info:    Total # of Visits Approved: 17  Progress Note Due Date: 25  Total # of Visits to Date: 62  Progress Note Counter: 1       OUTPATIENT PHYSICAL THERAPY:   Treatment Note 2024       Episode  (PT: Left humeral fracture)               Treatment Diagnosis:    Acute pain of left shoulder  Pain in left wrist  Medical/Referring Diagnosis:    Closed fracture of shaft of left humerus, unspecified fracture morphology, initial encounter [S42.302A]  Left wrist pain [M25.532]  Referring Physician:  Huy Matson MD MD Orders:   Evaluate & Treat:  -Left Shoulder: Full Active/Passive ROM and Full Strengthening;   -Left Wrist: Full Active/Passive ROM and Full Strengthening.  Return MD Appt:  TBD  Date of Onset:  Onset Date: 24 (Surgery: 2024)  Allergies:   Seasonal, Shellfish allergy, and Penicillins  Restrictions/Precautions:   No restrictions for L shoulder; strengthening restrictions for L wrist      Interventions Planned (Treatment may consist of any combination of the following):     See Assessment Note    Subjective Comments:   Patient reports he is doing ok today.    Initial Pain Level:: Left Shoulder 3/10   Post Session Pain Level:  Left  Shoulder 3/10   Medications Last Reviewed:  2024  Updated Objective Findings:  None Today  Treatment   THERAPEUTIC EXERCISE: (30 minutes):    Exercises per grid below to improve mobility and strength.  Required

## 2024-12-17 ENCOUNTER — HOSPITAL ENCOUNTER (OUTPATIENT)
Dept: PHYSICAL THERAPY | Age: 64
Setting detail: RECURRING SERIES
Discharge: HOME | End: 2024-12-20
Attending: ORTHOPAEDIC SURGERY
Payer: MEDICARE

## 2024-12-17 PROCEDURE — 97110 THERAPEUTIC EXERCISES: CPT

## 2024-12-17 PROCEDURE — 97140 MANUAL THERAPY 1/> REGIONS: CPT

## 2024-12-17 ASSESSMENT — PAIN SCALES - GENERAL: PAINLEVEL_OUTOF10: 3

## 2024-12-17 ASSESSMENT — PAIN DESCRIPTION - ORIENTATION: ORIENTATION: LEFT

## 2024-12-17 ASSESSMENT — PAIN DESCRIPTION - LOCATION: LOCATION: SHOULDER

## 2024-12-17 NOTE — PROGRESS NOTES
Mendel Ray McGill  : 1960  Primary: Medicare Part A And B ([employer]isela [employer])  Secondary:  [facility] @ [address]  [place] [address]  Phone: [phone]  Fax: [phone] Plan Frequency: 2 times per week  Plan of Care/Certification Expiration Date: 25        Plan of Care/Certification Expiration Date:  Plan of Care/Certification Expiration Date: 25    Frequency/Duration: Plan Frequency: 2 times per week     Time In/Out:   Time In: 0945  Time Out: 1025      PT Visit Info:    Total # of Visits Approved: 17  Progress Note Due Date: 25  Total # of Visits to Date: 63  Progress Note Counter: 2       OUTPATIENT PHYSICAL THERAPY:   Treatment Note 2024       Episode  (PT: Left humeral fracture)               Treatment Diagnosis:    Acute pain of left shoulder  Pain in left wrist  Medical/Referring Diagnosis:    Closed fracture of shaft of left humerus, unspecified fracture morphology, initial encounter [S42.302A]  Left wrist pain [M25.532]  Referring Physician:  Huy Matson MD MD Orders:   Evaluate & Treat:  -Left Shoulder: Full Active/Passive ROM and Full Strengthening;   -Left Wrist: Full Active/Passive ROM and Full Strengthening.  Return MD Appt:  TBD  Date of Onset:  Onset Date: 24 (Surgery: 2024)  Allergies:   Seasonal, Shellfish allergy, and Penicillins  Restrictions/Precautions:   No restrictions for L shoulder; strengthening restrictions for L wrist      Interventions Planned (Treatment may consist of any combination of the following):     See Assessment Note    Subjective Comments:   Patient reports he is doing ok today, just sore in his arm.    Initial Pain Level:: Left Shoulder 3/10   Post Session Pain Level:  Left  Shoulder 3/10   Medications Last Reviewed:  2024  Updated Objective Findings:  None Today  Treatment   THERAPEUTIC EXERCISE: (30 minutes):    Exercises per grid below to improve mobility and

## 2024-12-19 ENCOUNTER — HOSPITAL ENCOUNTER (OUTPATIENT)
Dept: PHYSICAL THERAPY | Age: 64
Setting detail: RECURRING SERIES
Discharge: HOME | End: 2024-12-22
Attending: ORTHOPAEDIC SURGERY
Payer: MEDICARE

## 2024-12-19 PROCEDURE — 97140 MANUAL THERAPY 1/> REGIONS: CPT

## 2024-12-19 PROCEDURE — 97110 THERAPEUTIC EXERCISES: CPT

## 2024-12-19 ASSESSMENT — PAIN SCALES - GENERAL: PAINLEVEL_OUTOF10: 3

## 2024-12-19 ASSESSMENT — PAIN DESCRIPTION - ORIENTATION: ORIENTATION: LEFT

## 2024-12-19 ASSESSMENT — PAIN DESCRIPTION - LOCATION: LOCATION: SHOULDER

## 2024-12-19 NOTE — PROGRESS NOTES
Mendel Ray McGill  : 1960  Primary: Medicare Part A And B ([employer]isela [employer]BS)  Secondary:  [facility] @ [address]  [place] [address]  Phone: [phone]  Fax: [phone] Plan Frequency: 2 times per week  Plan of Care/Certification Expiration Date: 25        Plan of Care/Certification Expiration Date:  Plan of Care/Certification Expiration Date: 25    Frequency/Duration: Plan Frequency: 2 times per week     Time In/Out:   Time In: 0945  Time Out: 1025      PT Visit Info:    Total # of Visits Approved: 17  Progress Note Due Date: 25  Total # of Visits to Date: 64  Progress Note Counter: 3       OUTPATIENT PHYSICAL THERAPY:   Treatment Note 2024       Episode  (PT: Left humeral fracture)               Treatment Diagnosis:    Acute pain of left shoulder  Pain in left wrist  Medical/Referring Diagnosis:    Closed fracture of shaft of left humerus, unspecified fracture morphology, initial encounter [S42.302A]  Left wrist pain [M25.532]  Referring Physician:  Huy Matson MD MD Orders:   Evaluate & Treat:  -Left Shoulder: Full Active/Passive ROM and Full Strengthening;   -Left Wrist: Full Active/Passive ROM and Full Strengthening.  Return MD Appt:  TBD  Date of Onset:  Onset Date: 24 (Surgery: 2024)  Allergies:   Seasonal, Shellfish allergy, and Penicillins  Restrictions/Precautions:   No restrictions for L shoulder; strengthening restrictions for L wrist      Interventions Planned (Treatment may consist of any combination of the following):     See Assessment Note    Subjective Comments:   Patient reports he is doing well.    Initial Pain Level:: Left Shoulder 3/10   Post Session Pain Level:  Left  Shoulder 3/10   Medications Last Reviewed:  2024  Updated Objective Findings:  None Today  Treatment   THERAPEUTIC EXERCISE: (30 minutes):    Exercises per grid below to improve mobility and strength.  Required minimal

## 2024-12-26 ENCOUNTER — HOSPITAL ENCOUNTER (OUTPATIENT)
Dept: PHYSICAL THERAPY | Age: 64
Setting detail: RECURRING SERIES
Discharge: HOME | End: 2024-12-29
Attending: ORTHOPAEDIC SURGERY
Payer: MEDICARE

## 2024-12-26 PROCEDURE — 97110 THERAPEUTIC EXERCISES: CPT

## 2024-12-26 PROCEDURE — 97140 MANUAL THERAPY 1/> REGIONS: CPT

## 2024-12-26 ASSESSMENT — PAIN DESCRIPTION - LOCATION: LOCATION: SHOULDER

## 2024-12-26 ASSESSMENT — PAIN SCALES - GENERAL: PAINLEVEL_OUTOF10: 3

## 2024-12-26 ASSESSMENT — PAIN DESCRIPTION - ORIENTATION: ORIENTATION: LEFT

## 2024-12-26 NOTE — PROGRESS NOTES
Mendel Ray McGill  : 1960  Primary: Medicare Part A And B (Gisela [employer])  Secondary:  [facility] @ [address]  [place] [address]  Phone: [phone]  Fax: [phone] Plan Frequency: 2 times per week  Plan of Care/Certification Expiration Date: 25        Plan of Care/Certification Expiration Date:  Plan of Care/Certification Expiration Date: 25    Frequency/Duration: Plan Frequency: 2 times per week     Time In/Out:   Time In: 0945  Time Out: 1025      PT Visit Info:    Total # of Visits Approved: 17  Progress Note Due Date: 25  Total # of Visits to Date: 65  Progress Note Counter: 4       OUTPATIENT PHYSICAL THERAPY:   Treatment Note 2024       Episode  (PT: Left humeral fracture)               Treatment Diagnosis:    Acute pain of left shoulder  Pain in left wrist  Medical/Referring Diagnosis:    Closed fracture of shaft of left humerus, unspecified fracture morphology, initial encounter [S42.302A]  Left wrist pain [M25.532]  Referring Physician:  Huy Matson MD MD Orders:   Evaluate & Treat:  -Left Shoulder: Full Active/Passive ROM and Full Strengthening;   -Left Wrist: Full Active/Passive ROM and Full Strengthening.  Return MD Appt:  TBD  Date of Onset:  Onset Date: 24 (Surgery: 2024)  Allergies:   Seasonal, Shellfish allergy, and Penicillins  Restrictions/Precautions:   No restrictions for L shoulder; strengthening restrictions for L wrist      Interventions Planned (Treatment may consist of any combination of the following):     See Assessment Note    Subjective Comments:   Patient reports he is doing well overall, just sore.    Initial Pain Level:: Left Shoulder 3/10   Post Session Pain Level:  Left  Shoulder 3/10   Medications Last Reviewed:  2024  Updated Objective Findings:  None Today  Treatment   THERAPEUTIC EXERCISE: (30 minutes):    Exercises per grid below to improve mobility and strength.

## 2024-12-27 ENCOUNTER — HOSPITAL ENCOUNTER (OUTPATIENT)
Dept: PHYSICAL THERAPY | Age: 64
Setting detail: RECURRING SERIES
Discharge: HOME | End: 2024-12-30
Attending: ORTHOPAEDIC SURGERY
Payer: MEDICARE

## 2024-12-27 PROCEDURE — 97110 THERAPEUTIC EXERCISES: CPT

## 2024-12-27 PROCEDURE — 97140 MANUAL THERAPY 1/> REGIONS: CPT

## 2024-12-27 ASSESSMENT — PAIN SCALES - GENERAL: PAINLEVEL_OUTOF10: 3

## 2024-12-27 ASSESSMENT — PAIN DESCRIPTION - ORIENTATION: ORIENTATION: LEFT

## 2024-12-27 ASSESSMENT — PAIN DESCRIPTION - LOCATION: LOCATION: SHOULDER

## 2024-12-27 NOTE — PROGRESS NOTES
Mendel Ray McGill  : 1960  Primary: Medicare Part A And B ([employer])  Secondary:  [facility] @ [address] AR[place] Asher  [place] [address]  Phone: [phone]  Fax: [phone] Plan Frequency: 2 times per week  Plan of Care/Certification Expiration Date: 25        Plan of Care/Certification Expiration Date:  Plan of Care/Certification Expiration Date: 25    Frequency/Duration: Plan Frequency: 2 times per week     Time In/Out:   Time In: 1030  Time Out: 1110      PT Visit Info:    Total # of Visits Approved: 17  Progress Note Due Date: 25  Total # of Visits to Date: 66  Progress Note Counter: 5       OUTPATIENT PHYSICAL THERAPY:   Treatment Note 2024       Episode  (PT: Left humeral fracture)               Treatment Diagnosis:    Acute pain of left shoulder  Pain in left wrist  Medical/Referring Diagnosis:    Closed fracture of shaft of left humerus, unspecified fracture morphology, initial encounter [S42.302A]  Left wrist pain [M25.532]  Referring Physician:  Huy Matson MD MD Orders:   Evaluate & Treat:  -Left Shoulder: Full Active/Passive ROM and Full Strengthening;   -Left Wrist: Full Active/Passive ROM and Full Strengthening.  Return MD Appt:  TBD  Date of Onset:  Onset Date: 24 (Surgery: 2024)  Allergies:   Seasonal, Shellfish allergy, and Penicillins  Restrictions/Precautions:   No restrictions for L shoulder; strengthening restrictions for L wrist      Interventions Planned (Treatment may consist of any combination of the following):     See Assessment Note    Subjective Comments:   Patient reports he is doing ok today.    Initial Pain Level:: Left Shoulder 3/10   Post Session Pain Level:  Left  Shoulder 3/10   Medications Last Reviewed:  2024  Updated Objective Findings:  None Today  Treatment   THERAPEUTIC EXERCISE: (30 minutes):    Exercises per grid below to improve mobility and strength.  Required

## 2024-12-31 ENCOUNTER — HOSPITAL ENCOUNTER (OUTPATIENT)
Dept: PHYSICAL THERAPY | Age: 64
Setting detail: RECURRING SERIES
Discharge: HOME | End: 2025-01-03
Attending: ORTHOPAEDIC SURGERY
Payer: MEDICARE

## 2024-12-31 PROCEDURE — 97110 THERAPEUTIC EXERCISES: CPT

## 2024-12-31 PROCEDURE — 97140 MANUAL THERAPY 1/> REGIONS: CPT

## 2024-12-31 ASSESSMENT — PAIN DESCRIPTION - LOCATION: LOCATION: SHOULDER

## 2024-12-31 ASSESSMENT — PAIN SCALES - GENERAL: PAINLEVEL_OUTOF10: 3

## 2024-12-31 ASSESSMENT — PAIN DESCRIPTION - ORIENTATION: ORIENTATION: LEFT

## 2024-12-31 NOTE — PROGRESS NOTES
Mendel Ray McGill  : 1960  Primary: Medicare Part A And B ([place] [employer])  Secondary:  [facility] @ [address] ARANGO Asher  [place] [address]  Phone: [phone]  Fax: [phone] Plan Frequency: 2 times per week  Plan of Care/Certification Expiration Date: 25        Plan of Care/Certification Expiration Date:  Plan of Care/Certification Expiration Date: 25    Frequency/Duration: Plan Frequency: 2 times per week     Time In/Out:   Time In: 0945  Time Out: 1025      PT Visit Info:    Total # of Visits Approved: 17  Progress Note Due Date: 25  Total # of Visits to Date: 67  Progress Note Counter: 6       OUTPATIENT PHYSICAL THERAPY:   Treatment Note 2024       Episode  (PT: Left humeral fracture)               Treatment Diagnosis:    Acute pain of left shoulder  Pain in left wrist  Medical/Referring Diagnosis:    Closed fracture of shaft of left humerus, unspecified fracture morphology, initial encounter [S42.302A]  Left wrist pain [M25.532]  Referring Physician:  Huy Matson MD MD Orders:   Evaluate & Treat:  -Left Shoulder: Full Active/Passive ROM and Full Strengthening;   -Left Wrist: Full Active/Passive ROM and Full Strengthening.  Return MD Appt:  TBD  Date of Onset:  Onset Date: 24 (Surgery: 2024)  Allergies:   Seasonal, Shellfish allergy, and Penicillins  Restrictions/Precautions:   No restrictions for L shoulder; strengthening restrictions for L wrist      Interventions Planned (Treatment may consist of any combination of the following):     See Assessment Note    Subjective Comments:   Patient reports he is trying to use it and stretch it more.    Initial Pain Level:: Left Shoulder 3/10   Post Session Pain Level:  Left  Shoulder 3/10   Medications Last Reviewed:  2024  Updated Objective Findings:  None Today  Treatment   THERAPEUTIC EXERCISE: (30 minutes):    Exercises per grid below to improve mobility and

## 2025-01-07 ENCOUNTER — HOSPITAL ENCOUNTER (OUTPATIENT)
Dept: PHYSICAL THERAPY | Age: 65
Setting detail: RECURRING SERIES
Discharge: HOME | End: 2025-01-10
Attending: ORTHOPAEDIC SURGERY
Payer: MEDICARE

## 2025-01-07 PROCEDURE — 97140 MANUAL THERAPY 1/> REGIONS: CPT

## 2025-01-07 PROCEDURE — 97110 THERAPEUTIC EXERCISES: CPT

## 2025-01-07 ASSESSMENT — PAIN DESCRIPTION - ORIENTATION: ORIENTATION: LEFT

## 2025-01-07 ASSESSMENT — PAIN DESCRIPTION - LOCATION: LOCATION: SHOULDER

## 2025-01-07 ASSESSMENT — PAIN SCALES - GENERAL: PAINLEVEL_OUTOF10: 3

## 2025-01-07 NOTE — PROGRESS NOTES
Mendel Ray McGill  : 1960  Primary: Medicare Part A And B ([place])  Secondary:  [facility] @ [address] AR[place]GO Asher  [place] [address]  Phone: [phone]  Fax: [phone] Plan Frequency: 2 times per week  Plan of Care/Certification Expiration Date: 25        Plan of Care/Certification Expiration Date:  Plan of Care/Certification Expiration Date: 25    Frequency/Duration: Plan Frequency: 2 times per week     Time In/Out:   Time In: 1600  Time Out: 1640      PT Visit Info:    Total # of Visits Approved: 17  Progress Note Due Date: 25  Total # of Visits to Date: 68  Progress Note Counter: 7       OUTPATIENT PHYSICAL THERAPY:   Treatment Note 2025       Episode  (PT: Left humeral fracture)               Treatment Diagnosis:    Acute pain of left shoulder  Pain in left wrist  Medical/Referring Diagnosis:    Closed fracture of shaft of left humerus, unspecified fracture morphology, initial encounter [S42.302A]  Left wrist pain [M25.532]  Referring Physician:  Huy Matson MD MD Orders:   Evaluate & Treat:  -Left Shoulder: Full Active/Passive ROM and Full Strengthening;   -Left Wrist: Full Active/Passive ROM and Full Strengthening.  Return MD Appt:  TBD  Date of Onset:  Onset Date: 24 (Surgery: 2024)  Allergies:   Seasonal, Shellfish allergy, and Penicillins  Restrictions/Precautions:   No restrictions for L shoulder; strengthening restrictions for L wrist      Interventions Planned (Treatment may consist of any combination of the following):     See Assessment Note    Subjective Comments:   Patient reports he is doing ok today.    Initial Pain Level:: Left Shoulder 3/10   Post Session Pain Level:  Left  Shoulder 3/10   Medications Last Reviewed:  2025  Updated Objective Findings:  None Today  Treatment   THERAPEUTIC EXERCISE: (30 minutes):    Exercises per grid below to improve mobility and strength.  Required minimal

## 2025-01-14 ENCOUNTER — HOSPITAL ENCOUNTER (OUTPATIENT)
Dept: PHYSICAL THERAPY | Age: 65
Setting detail: RECURRING SERIES
Discharge: HOME | End: 2025-01-17
Attending: ORTHOPAEDIC SURGERY
Payer: MEDICARE

## 2025-01-14 PROCEDURE — 97140 MANUAL THERAPY 1/> REGIONS: CPT

## 2025-01-14 PROCEDURE — 97110 THERAPEUTIC EXERCISES: CPT

## 2025-01-14 ASSESSMENT — PAIN DESCRIPTION - LOCATION: LOCATION: SHOULDER

## 2025-01-14 ASSESSMENT — PAIN DESCRIPTION - ORIENTATION: ORIENTATION: LEFT

## 2025-01-14 ASSESSMENT — PAIN SCALES - GENERAL: PAINLEVEL_OUTOF10: 3

## 2025-01-14 NOTE — PROGRESS NOTES
Mendel Ray McGill  : 1960  Primary: Medicare Part A And B ([place] [employer])  Secondary:  [facility] @ [address] AR[place]GO Asher  [place] [address]  Phone: [phone]  Fax: [phone] Plan Frequency: 2 times per week  Plan of Care/Certification Expiration Date: 25        Plan of Care/Certification Expiration Date:  Plan of Care/Certification Expiration Date: 25    Frequency/Duration: Plan Frequency: 2 times per week      Time In/Out:   Time In: 1030  Time Out: 1110      PT Visit Info:    Total # of Visits Approved: 17  Progress Note Due Date: 25  Total # of Visits to Date: 69  Progress Note Counter: 8                   OUTPATIENT PHYSICAL THERAPY:             Recertification 2025               Episode (PT: Left humeral fracture)         Treatment Diagnosis:     Acute pain of left shoulder  Pain in left wrist  Medical/Referring Diagnosis:    Closed fracture of shaft of left humerus, unspecified fracture morphology, initial encounter [S42.302A]  Left wrist pain [M25.532]  Referring Physician:  Huy Matson MD MD Orders:  Evaluate & Treat:  -Left Shoulder: Full Active/Passive ROM and Full Strengthening;   -Left Wrist: Full Active/Gentle Passive ROM and NO Strenghtening.   Return MD Appt:  2024  Date of Onset:  Onset Date: 24 (Surgery: 2024)  Allergies:  Seasonal, Shellfish allergy, and Penicillins  Restrictions/Precautions:    No restrictions for L shoulder; strengthening restrictions for L wrist      Medications Last Reviewed:  2025     SUBJECTIVE   History of Injury/Illness (Reason for Referral):  2024 (Progress Note): Patient reports that he is doing ok. He reports that his shoulder still clicks and pops when he moves it in certain directions.  He reports that overall he's doing well.    8/15/2024 (Recertification): Patient reports his shoulder still has popping when he moves it overhead.    2024

## 2025-01-14 NOTE — PROGRESS NOTES
Mendel Ray McGill  : 1960  Primary: Medicare Part A And B ([place] [employer])  Secondary:  [facility] @ [address] [place] Asher  [place] [address]  Phone: [phone]  Fax: [phone] Plan Frequency: 2 times per week  Plan of Care/Certification Expiration Date: 25        Plan of Care/Certification Expiration Date:  Plan of Care/Certification Expiration Date: 25    Frequency/Duration: Plan Frequency: 2 times per week     Time In/Out:   Time In: 1030  Time Out: 1110      PT Visit Info:    Total # of Visits Approved: 17  Progress Note Due Date: 25  Total # of Visits to Date: 69  Progress Note Counter: 8       OUTPATIENT PHYSICAL THERAPY:   Treatment Note 2025       Episode  (PT: Left humeral fracture)               Treatment Diagnosis:    Acute pain of left shoulder  Pain in left wrist  Medical/Referring Diagnosis:    Closed fracture of shaft of left humerus, unspecified fracture morphology, initial encounter [S42.302A]  Left wrist pain [M25.532]  Referring Physician:  Huy Matson MD MD Orders:   Evaluate & Treat:  -Left Shoulder: Full Active/Passive ROM and Full Strengthening;   -Left Wrist: Full Active/Passive ROM and Full Strengthening.  Return MD Appt:  TBD  Date of Onset:  Onset Date: 24 (Surgery: 2024)  Allergies:   Seasonal, Shellfish allergy, and Penicillins  Restrictions/Precautions:   No restrictions for L shoulder; strengthening restrictions for L wrist      Interventions Planned (Treatment may consist of any combination of the following):     See Assessment Note    Subjective Comments:   Patient reports he is doing ok.    Initial Pain Level:: Left Shoulder 3/10   Post Session Pain Level:  Left  Shoulder 3/10   Medications Last Reviewed:  2025  Updated Objective Findings:  None Today  Treatment   THERAPEUTIC EXERCISE: (30 minutes):    Exercises per grid below to improve mobility and strength.  Required minimal

## 2025-01-21 ENCOUNTER — HOSPITAL ENCOUNTER (OUTPATIENT)
Dept: PHYSICAL THERAPY | Age: 65
Setting detail: RECURRING SERIES
Discharge: HOME | End: 2025-01-24
Attending: ORTHOPAEDIC SURGERY
Payer: MEDICARE

## 2025-01-21 PROCEDURE — 97140 MANUAL THERAPY 1/> REGIONS: CPT

## 2025-01-21 PROCEDURE — 97110 THERAPEUTIC EXERCISES: CPT

## 2025-01-21 ASSESSMENT — PAIN SCALES - GENERAL: PAINLEVEL_OUTOF10: 3

## 2025-01-21 ASSESSMENT — PAIN DESCRIPTION - ORIENTATION: ORIENTATION: LEFT

## 2025-01-21 ASSESSMENT — PAIN DESCRIPTION - LOCATION: LOCATION: SHOULDER

## 2025-01-21 NOTE — PROGRESS NOTES
Mendel Ray McGill  : 1960  Primary: Medicare Part A And B ([employer])  Secondary:  [facility] @ [address] AARNGO Asher  [facility] [address]  Phone: [phone]  Fax: [phone] Plan Frequency: 2 times per week  Plan of Care/Certification Expiration Date: 25        Plan of Care/Certification Expiration Date:  Plan of Care/Certification Expiration Date: 25    Frequency/Duration: Plan Frequency: 2 times per week     Time In/Out:   Time In: 1030  Time Out: 1110      PT Visit Info:    Total # of Visits Approved: 17  Progress Note Due Date: 25  Total # of Visits to Date: 70  Progress Note Counter: 9       OUTPATIENT PHYSICAL THERAPY:   Treatment Note 2025       Episode  (PT: Left humeral fracture)               Treatment Diagnosis:    Acute pain of left shoulder  Pain in left wrist  Medical/Referring Diagnosis:    Closed fracture of shaft of left humerus, unspecified fracture morphology, initial encounter [S42.302A]  Left wrist pain [M25.532]  Referring Physician:  Huy Matson MD MD Orders:   Evaluate & Treat:  -Left Shoulder: Full Active/Passive ROM and Full Strengthening;   -Left Wrist: Full Active/Passive ROM and Full Strengthening.  Return MD Appt:  TBD  Date of Onset:  Onset Date: 24 (Surgery: 2024)  Allergies:   Seasonal, Shellfish allergy, and Penicillins  Restrictions/Precautions:   No restrictions for L shoulder; strengthening restrictions for L wrist      Interventions Planned (Treatment may consist of any combination of the following):     See Assessment Note    Subjective Comments:   Patient reports he is trying to do more and more.    Initial Pain Level:: Left Shoulder 3/10   Post Session Pain Level:  Left  Shoulder 3/10   Medications Last Reviewed:  2025  Updated Objective Findings:  None Today  Treatment   THERAPEUTIC EXERCISE: (30 minutes):    Exercises per grid below to improve mobility and strength.

## 2025-01-28 ENCOUNTER — HOSPITAL ENCOUNTER (OUTPATIENT)
Dept: PHYSICAL THERAPY | Age: 65
Setting detail: RECURRING SERIES
Discharge: HOME | End: 2025-01-31
Attending: ORTHOPAEDIC SURGERY
Payer: MEDICARE

## 2025-01-28 PROCEDURE — 97140 MANUAL THERAPY 1/> REGIONS: CPT

## 2025-01-28 PROCEDURE — 97110 THERAPEUTIC EXERCISES: CPT

## 2025-01-28 ASSESSMENT — PAIN SCALES - GENERAL: PAINLEVEL_OUTOF10: 3

## 2025-01-28 ASSESSMENT — PAIN DESCRIPTION - LOCATION: LOCATION: SHOULDER

## 2025-01-28 ASSESSMENT — PAIN DESCRIPTION - ORIENTATION: ORIENTATION: LEFT

## 2025-01-28 NOTE — PROGRESS NOTES
Mendel Ray McGill  : 1960  Primary: Medicare Part A And B ([place] [employer])  Secondary:  [facility] @ [address] AR[address]GO Asher  [place] [address]  Phone: [phone]  Fax: [phone] Plan Frequency: 2 times per week  Plan of Care/Certification Expiration Date: 25        Plan of Care/Certification Expiration Date:  Plan of Care/Certification Expiration Date: 25    Frequency/Duration: Plan Frequency: 2 times per week     Time In/Out:   Time In: 1115  Time Out: 1155      PT Visit Info:    Total # of Visits Approved: 17  Progress Note Due Date: 25  Total # of Visits to Date: 71  Progress Note Counter: 3       OUTPATIENT PHYSICAL THERAPY:   Treatment Note 2025       Episode  (PT: Left humeral fracture)               Treatment Diagnosis:    Acute pain of left shoulder  Pain in left wrist  Medical/Referring Diagnosis:    Closed fracture of shaft of left humerus, unspecified fracture morphology, initial encounter [S42.302A]  Left wrist pain [M25.532]  Referring Physician:  Huy Matson MD MD Orders:   Evaluate & Treat:  -Left Shoulder: Full Active/Passive ROM and Full Strengthening;   -Left Wrist: Full Active/Passive ROM and Full Strengthening.  Return MD Appt:  TBD  Date of Onset:  Onset Date: 24 (Surgery: 2024)  Allergies:   Seasonal, Shellfish allergy, and Penicillins  Restrictions/Precautions:   No restrictions for L shoulder; strengthening restrictions for L wrist      Interventions Planned (Treatment may consist of any combination of the following):     See Assessment Note    Subjective Comments:   Patient reports he is doing ok. He reports he's been working with his son in his garage, so he's sore, but no pain.  He reports his left shoulder still does not have the range of motion that his right shoulder has.    Initial Pain Level:: Left Shoulder 3/10   Post Session Pain Level:  Left  Shoulder 3/10   Medications Last Reviewed:

## 2025-02-05 ENCOUNTER — HOSPITAL ENCOUNTER (OUTPATIENT)
Dept: PHYSICAL THERAPY | Age: 65
Setting detail: RECURRING SERIES
Discharge: HOME | End: 2025-02-08
Attending: ORTHOPAEDIC SURGERY
Payer: MEDICARE

## 2025-02-05 PROCEDURE — 97110 THERAPEUTIC EXERCISES: CPT

## 2025-02-05 PROCEDURE — 97140 MANUAL THERAPY 1/> REGIONS: CPT

## 2025-02-05 ASSESSMENT — PAIN DESCRIPTION - ORIENTATION: ORIENTATION: LEFT

## 2025-02-05 ASSESSMENT — PAIN DESCRIPTION - LOCATION: LOCATION: SHOULDER

## 2025-02-05 ASSESSMENT — PAIN SCALES - GENERAL: PAINLEVEL_OUTOF10: 3

## 2025-02-05 NOTE — PROGRESS NOTES
CLIFFORD OviedoEORPKASEY OSWALD   2/18/2025  9:00 AM Brittani Barba PT [facility]   2/25/2025  9:00 AM Brittani Barba PT [facility]

## 2025-02-11 ENCOUNTER — HOSPITAL ENCOUNTER (OUTPATIENT)
Dept: PHYSICAL THERAPY | Age: 65
Setting detail: RECURRING SERIES
Discharge: HOME | End: 2025-02-14
Attending: ORTHOPAEDIC SURGERY
Payer: MEDICARE

## 2025-02-11 PROCEDURE — 97140 MANUAL THERAPY 1/> REGIONS: CPT

## 2025-02-11 PROCEDURE — 97110 THERAPEUTIC EXERCISES: CPT

## 2025-02-11 ASSESSMENT — PAIN DESCRIPTION - ORIENTATION: ORIENTATION: LEFT

## 2025-02-11 ASSESSMENT — PAIN SCALES - GENERAL: PAINLEVEL_OUTOF10: 3

## 2025-02-11 ASSESSMENT — PAIN DESCRIPTION - LOCATION: LOCATION: SHOULDER

## 2025-02-11 NOTE — PROGRESS NOTES
Mendel Ray McGill  : 1960  Primary: Medicare Part A And B ([place] [employer])  Secondary:  [facility] @ [address] [place] Asher  [place] [address]  Phone: [phone]  Fax: [phone] Plan Frequency: 2 times per week  Plan of Care/Certification Expiration Date: 25        Plan of Care/Certification Expiration Date:  Plan of Care/Certification Expiration Date: 25    Frequency/Duration: Plan Frequency: 2 times per week     Time In/Out:   Time In: 0900  Time Out: 0940      PT Visit Info:    Total # of Visits Approved: 17  Progress Note Due Date: 25  Total # of Visits to Date: 73  Progress Note Counter: 5       OUTPATIENT PHYSICAL THERAPY:   Treatment Note 2025       Episode  (PT: Left humeral fracture)               Treatment Diagnosis:    Acute pain of left shoulder  Pain in left wrist  Medical/Referring Diagnosis:    Closed fracture of shaft of left humerus, unspecified fracture morphology, initial encounter [S42.302A]  Left wrist pain [M25.532]  Referring Physician:  Huy Matson MD MD Orders:   Evaluate & Treat:  -Left Shoulder: Full Active/Passive ROM and Full Strengthening;   -Left Wrist: Full Active/Passive ROM and Full Strengthening.  Return MD Appt:  TBD  Date of Onset:  Onset Date: 24 (Surgery: 2024)  Allergies:   Seasonal, Shellfish allergy, and Penicillins  Restrictions/Precautions:   No restrictions for L shoulder; strengthening restrictions for L wrist      Interventions Planned (Treatment may consist of any combination of the following):     See Assessment Note    Subjective Comments:   Patient reports he feels he has more mobility.    Initial Pain Level:: Left Shoulder 3/10   Post Session Pain Level:  Left  Shoulder 3/10   Medications Last Reviewed:  2025  Updated Objective Findings:  None Today  Treatment   THERAPEUTIC EXERCISE: (30 minutes):    Exercises per grid below to improve mobility and strength.

## 2025-02-18 ENCOUNTER — HOSPITAL ENCOUNTER (OUTPATIENT)
Dept: PHYSICAL THERAPY | Age: 65
Setting detail: RECURRING SERIES
Discharge: HOME | End: 2025-02-21
Attending: ORTHOPAEDIC SURGERY
Payer: MEDICARE

## 2025-02-18 PROCEDURE — 97110 THERAPEUTIC EXERCISES: CPT

## 2025-02-18 PROCEDURE — 97140 MANUAL THERAPY 1/> REGIONS: CPT

## 2025-02-18 ASSESSMENT — PAIN DESCRIPTION - LOCATION: LOCATION: SHOULDER

## 2025-02-18 ASSESSMENT — PAIN DESCRIPTION - ORIENTATION: ORIENTATION: LEFT

## 2025-02-18 ASSESSMENT — PAIN SCALES - GENERAL: PAINLEVEL_OUTOF10: 3

## 2025-02-18 NOTE — PROGRESS NOTES
Mendel Ray McGill  : 1960  Primary: Medicare Part A And B ([place] [employer])  Secondary:  [facility] @ [address] [place] Asher  [place] [address]  Phone: [phone]  Fax: [phone] Plan Frequency: 1 time per week for 90 days    Plan of Care/Certification Expiration Date: 25        Plan of Care/Certification Expiration Date:  Plan of Care/Certification Expiration Date: 25    Frequency/Duration: Plan Frequency: 1 time per week for 90 days        Time In/Out:   Time In: 0900  Time Out: 0940      PT Visit Info:    Progress Note Due Date: 3/20/2025  Total # of Visits to Date: 74  Progress Note Counter: 1                   OUTPATIENT PHYSICAL THERAPY:             Progress Report 2025               Episode (PT: Left humeral fracture)         Treatment Diagnosis:     Acute pain of left shoulder  Pain in left wrist  Medical/Referring Diagnosis:    Closed fracture of shaft of left humerus, unspecified fracture morphology, initial encounter [S42.302A]  Left wrist pain [M25.532]  Referring Physician:  Huy Matson MD MD Orders:  Evaluate & Treat:  -Left Shoulder: Full Active/Passive ROM and Full Strengthening;   -Left Wrist: Full Active/Gentle Passive ROM and NO Strenghtening.   Return MD Appt:  2024  Date of Onset:     Allergies:  Seasonal, Shellfish allergy, and Penicillins  Restrictions/Precautions:    No restrictions for L shoulder; strengthening restrictions for L wrist      Medications Last Reviewed:  2025     SUBJECTIVE   History of Injury/Illness (Reason for Referral):  2024 (Progress Note): Patient reports that he is doing ok. He reports that his shoulder still clicks and pops when he moves it in certain directions.  He reports that overall he's doing well.    8/15/2024 (Recertification): Patient reports his shoulder still has popping when he moves it overhead.    2024 (Recertification): Patient reports he had his

## 2025-02-18 NOTE — PROGRESS NOTES
Mendel Ray McGill  : 1960  Primary: Medicare Part A And B ([place] [employer])  Secondary:  [facility] @ [address] [place]her  [place] [address]  Phone: [phone]  Fax: [phone] Plan Frequency: 1 time per week for 90 days    Plan of Care/Certification Expiration Date: 25        Plan of Care/Certification Expiration Date:  Plan of Care/Certification Expiration Date: 25    Frequency/Duration: Plan Frequency: 1 time per week for 90 days     Time In/Out:   Time In: 0900  Time Out: 0940      PT Visit Info:    Total # of Visits Approved: 17  Progress Note Due Date: 25  Total # of Visits to Date: 74  Progress Note Counter: 1       OUTPATIENT PHYSICAL THERAPY:   Treatment Note 2025       Episode  (PT: Left humeral fracture)               Treatment Diagnosis:    Acute pain of left shoulder  Pain in left wrist  Medical/Referring Diagnosis:    Closed fracture of shaft of left humerus, unspecified fracture morphology, initial encounter [S42.302A]  Left wrist pain [M25.532]  Referring Physician:  Huy Matson MD MD Orders:   Evaluate & Treat:  -Left Shoulder: Full Active/Passive ROM and Full Strengthening;   -Left Wrist: Full Active/Passive ROM and Full Strengthening.  Return MD Appt:  TBD  Date of Onset:  No data recorded  Allergies:   Seasonal, Shellfish allergy, and Penicillins  Restrictions/Precautions:   No restrictions for L shoulder; strengthening restrictions for L wrist      Interventions Planned (Treatment may consist of any combination of the following):     See Assessment Note    Subjective Comments:   Patient reports he is doing pretty good today, just sore.    Initial Pain Level:: Left Shoulder 3/10   Post Session Pain Level:  Left  Shoulder 3/10   Medications Last Reviewed:  2025  Updated Objective Findings:  None Today  Treatment   THERAPEUTIC EXERCISE: (30 minutes):    Exercises per grid below to improve mobility and strength.

## 2025-02-25 ENCOUNTER — HOSPITAL ENCOUNTER (OUTPATIENT)
Dept: PHYSICAL THERAPY | Age: 65
Setting detail: RECURRING SERIES
Discharge: HOME | End: 2025-02-28
Attending: ORTHOPAEDIC SURGERY
Payer: MEDICARE

## 2025-02-25 PROCEDURE — 97140 MANUAL THERAPY 1/> REGIONS: CPT

## 2025-02-25 PROCEDURE — 97110 THERAPEUTIC EXERCISES: CPT

## 2025-02-25 ASSESSMENT — PAIN SCALES - GENERAL: PAINLEVEL_OUTOF10: 3

## 2025-02-25 ASSESSMENT — PAIN DESCRIPTION - ORIENTATION: ORIENTATION: LEFT

## 2025-02-25 ASSESSMENT — PAIN DESCRIPTION - LOCATION: LOCATION: SHOULDER

## 2025-02-25 NOTE — PROGRESS NOTES
Mendel Ray McGill  : 1960  Primary: Medicare Part A And B ([employer])  Secondary:  [facility] @ [address] AR[place] Asher  [facility] [address]  Phone: [phone]  Fax: [phone] Plan Frequency: 1 time per week for 90 days    Plan of Care/Certification Expiration Date: 25        Plan of Care/Certification Expiration Date:  Plan of Care/Certification Expiration Date: 25    Frequency/Duration: Plan Frequency: 1 time per week for 90 days     Time In/Out:   Time In: 0845  Time Out: 0930      PT Visit Info:    Total # of Visits Approved: 17  Progress Note Due Date: 25  Total # of Visits to Date: 75  Progress Note Counter: 2       OUTPATIENT PHYSICAL THERAPY:   Treatment Note 2025       Episode  (PT: Left humeral fracture)               Treatment Diagnosis:    Acute pain of left shoulder  Pain in left wrist  Medical/Referring Diagnosis:    Closed fracture of shaft of left humerus, unspecified fracture morphology, initial encounter [S42.302A]  Left wrist pain [M25.532]  Referring Physician:  Huy Matson MD MD Orders:   Evaluate & Treat:  -Left Shoulder: Full Active/Passive ROM and Full Strengthening;   -Left Wrist: Full Active/Passive ROM and Full Strengthening.  Return MD Appt:  TBD  Date of Onset:  No data recorded  Allergies:   Seasonal, Shellfish allergy, and Penicillins  Restrictions/Precautions:   No restrictions for L shoulder; strengthening restrictions for L wrist      Interventions Planned (Treatment may consist of any combination of the following):     See Assessment Note    Subjective Comments:   Patient reports he is doing well and feeling good.    Initial Pain Level:: Left Shoulder 3/10   Post Session Pain Level:  Left  Shoulder 3/10   Medications Last Reviewed:  2025  Updated Objective Findings:  None Today  Treatment   THERAPEUTIC EXERCISE: (35 minutes):    Exercises per grid below to improve mobility and strength.  Required

## 2025-03-04 ENCOUNTER — HOSPITAL ENCOUNTER (OUTPATIENT)
Dept: PHYSICAL THERAPY | Age: 65
Setting detail: RECURRING SERIES
Discharge: HOME | End: 2025-03-07
Attending: ORTHOPAEDIC SURGERY
Payer: MEDICARE

## 2025-03-04 PROCEDURE — 97110 THERAPEUTIC EXERCISES: CPT

## 2025-03-04 PROCEDURE — 97140 MANUAL THERAPY 1/> REGIONS: CPT

## 2025-03-04 ASSESSMENT — PAIN DESCRIPTION - LOCATION: LOCATION: SHOULDER

## 2025-03-04 ASSESSMENT — PAIN DESCRIPTION - ORIENTATION: ORIENTATION: LEFT

## 2025-03-04 ASSESSMENT — PAIN SCALES - GENERAL: PAINLEVEL_OUTOF10: 3

## 2025-03-11 ENCOUNTER — APPOINTMENT (OUTPATIENT)
Dept: PHYSICAL THERAPY | Age: 65
End: 2025-03-11
Attending: ORTHOPAEDIC SURGERY
Payer: MEDICARE

## 2025-03-18 ENCOUNTER — HOSPITAL ENCOUNTER (OUTPATIENT)
Dept: PHYSICAL THERAPY | Age: 65
Setting detail: RECURRING SERIES
Discharge: HOME | End: 2025-03-21
Attending: ORTHOPAEDIC SURGERY
Payer: MEDICARE

## 2025-03-18 PROCEDURE — 97140 MANUAL THERAPY 1/> REGIONS: CPT

## 2025-03-18 PROCEDURE — 97110 THERAPEUTIC EXERCISES: CPT

## 2025-03-18 ASSESSMENT — PAIN SCALES - GENERAL: PAINLEVEL_OUTOF10: 3

## 2025-03-18 ASSESSMENT — PAIN DESCRIPTION - ORIENTATION: ORIENTATION: LEFT

## 2025-03-18 ASSESSMENT — PAIN DESCRIPTION - LOCATION: LOCATION: SHOULDER

## 2025-03-18 NOTE — PROGRESS NOTES
Required minimal visual, verbal, and manual cues to promote proper body alignment, promote proper body posture, and promote proper body mechanics.  Progressed resistance, range, repetitions, and complexity of movement as indicated.   Date:  3/18/2025   Activity/Exercise Parameters   UBE 6 minutes  Level 4   Nautilus rows 65 pounds  20 reps  2 sets   Nautilus chest press 60 pounds  15 reps  2 sets   Bicep curls 10 pounds  20 reps  B UE   Bent over rows 10 pounds  15 reps  B UE   Bent over \"T\" 5 pounds  15 reps  L UE   Bent over \"Y\" 5 pounds  15 reps  L UE   Bent over \"I\" 5 pounds  15 reps  L UE   Sidelying shoulder abduction 5 pounds  15 reps  L UE   Sidelying shoulder external rotation 5 pounds  15 reps  L UE   Sidelying shoulder horizontal abduction/adduction 3 pounds  15 reps  L UE   Theraband Internal rotation Green t-band  20 reps   Theraband external rotation Green t-band  15 reps      MANUAL THERAPY: (10 minutes - L UE):   Joint mobilization and Soft tissue mobilization was utilized and necessary because of the patient's restricted joint motion and restricted motion of soft tissue. (Upper trap, lower trap, rhomboids, first rib, pectoralis major, and pectoralis minor; PROM in all planes, pushing motion; grade II-IV joint mobs as tolerated to glenohumeral joint..)    Treatment/Session Summary:    Treatment Assessment:   Patient tolerated treatment well with improving overall mobility.  Communication/Consultation:  None today  Equipment provided today:  None  Recommendations/Intent for next treatment session: Next visit will focus on improving overall mobility, strength, and pain with daily activities.    >Total Treatment Billable Duration:  40 minutes  Time In: 0900  Time Out: 0940     BRITTANI AVENDAÑO PT         [employer]  [employer]  Appt Desk     Future Appointments   Date Time Provider Department Center   3/25/2025  9:00 AM Brittani Avendaño PT [facility]

## 2025-03-18 NOTE — THERAPY RECERTIFICATION
ongoing.        Outcome Measure:   Tool Used: Disabilities of the Arm, Shoulder and Hand (DASH) Questionnaire - Quick Version  Score:  Initial: 40/55  Most Recent: 20/55 (Date: 3/18/2025 )   Interpretation of Score: The DASH is designed to measure the activities of daily living in person's with upper extremity dysfunction or pain.  Each section is scored on a 1-5 scale, 5 representing the greatest disability.  The scores of each section are added together for a total score of 55.      Medical Necessity:   > Patient is expected to demonstrate progress in strength and range of motion to improve safety during daily activities.  > Patient demonstrates good rehab potential due to higher previous functional level.  > Skilled intervention continues to be required due to decreased functional mobility with daily activities.  Reason For Services/Other Comments:  > Patient continues to demonstrate capacity to improve overall functional mobility which will increase independence and increase safety.      Regarding Mendel Valdez Moseley's therapy, I certify that the treatment plan above will be carried out by a therapist or under their direction.  Thank you for this referral,  CHARAN AVENDAÑO PT     Referring Physician Signature: Huy Matson MD                    [employer]  Appt Desk

## 2025-03-25 ENCOUNTER — HOSPITAL ENCOUNTER (OUTPATIENT)
Dept: PHYSICAL THERAPY | Age: 65
Setting detail: RECURRING SERIES
End: 2025-03-25
Attending: ORTHOPAEDIC SURGERY
Payer: MEDICARE

## (undated) DEVICE — BIT DRL L145MM DIA3.2MM 3 FLUT QUIK CPL FOR EXPERT TIB

## (undated) DEVICE — GARMENT,MEDLINE,DVT,INT,CALF,MED, GEN2: Brand: MEDLINE

## (undated) DEVICE — 3M™ STERI-DRAPE™  POUCH WITH IOBAN™ 2 INCISE FILM 6657: Brand: STERI-DRAPE™ IOBAN™ 2

## (undated) DEVICE — STOCKINET,IMPERVIOUS,6X30: Brand: MEDLINE

## (undated) DEVICE — NDL SPNE QNCKE 18GX3.5IN LF --

## (undated) DEVICE — SPONGE GZ W4XL4IN COT 12 PLY TYP VII WVN C FLD DSGN STERILE

## (undated) DEVICE — SYR LR LCK 1ML GRAD NSAF 30ML --

## (undated) DEVICE — BLADE SHV CUT MENIS AGG + 4MM --

## (undated) DEVICE — SHEET,DRAPE,53X77,STERILE: Brand: MEDLINE

## (undated) DEVICE — UPPER EXTREMITY: Brand: MEDLINE INDUSTRIES, INC.

## (undated) DEVICE — BANDAGE COBAN 4 IN COMPR W4INXL5YD FOAM COHESIVE QUIK STK SELF ADH SFT

## (undated) DEVICE — SPONGE LAPAROTOMY W18XL18IN WHITE STRUNG RADIOPAQUE STERILE

## (undated) DEVICE — SOLUTION IRRIG 3000ML 0.9% SOD CHL FLX CONT 0797208] ICU MEDICAL INC]

## (undated) DEVICE — (D)PREP SKN CHLRAPRP APPL 26ML -- CONVERT TO ITEM 371833

## (undated) DEVICE — SHEET, ORTHO, SPLIT, STERILE: Brand: MEDLINE

## (undated) DEVICE — SOLUTION IRRIG 1000ML 0.9% SOD CHL USP POUR PLAS BTL

## (undated) DEVICE — (D)STRIP SKN CLSR 0.5X4IN WHT --

## (undated) DEVICE — SET IRRIG DST FLX M CONN

## (undated) DEVICE — SINGLE BASIN: Brand: CARDINAL HEALTH

## (undated) DEVICE — BIT DRL L270MM DIA3.8MM 3 FLUT QUIK CPL NONRADIOLUCENT W/O

## (undated) DEVICE — KIT ARMOR C DRP COLLAPSIBLE AND SELF EXP TOP CVR FOR FLUOROSCOPIC

## (undated) DEVICE — DRAPE TWL SURG 16X26IN BLU ORB04] ALLCARE INC]

## (undated) DEVICE — GLOVE ORANGE PI 7 1/2   MSG9075

## (undated) DEVICE — STOCKINETTE,IMPERVIOUS,12X48,STERILE: Brand: MEDLINE

## (undated) DEVICE — SINGLE PORT MANIFOLD: Brand: NEPTUNE 2

## (undated) DEVICE — SURGICAL PROCEDURE PACK BASIC ST FRANCIS

## (undated) DEVICE — SUTURE MONOCRYL SZ 2-0 L27IN ABSRB UD SH L26MM TAPERPOINT NDL Y417H

## (undated) DEVICE — PADDING CAST W4INXL4YD ST COT COHESIVE HND TEARABLE SPEC

## (undated) DEVICE — INTENDED FOR TISSUE SEPARATION, AND OTHER PROCEDURES THAT REQUIRE A SHARP SURGICAL BLADE TO PUNCTURE OR CUT.: Brand: BARD-PARKER ® STAINLESS STEEL BLADES

## (undated) DEVICE — SLING ARM M PCH 8X17IN STRP 2 37IN LT BLU COT FOAM SHLDR PD

## (undated) DEVICE — SHEET, DRAPE, SPLIT, STERILE: Brand: MEDLINE

## (undated) DEVICE — Device

## (undated) DEVICE — SUTURE MCRYL SZ 3-0 L27IN ABSRB UD L19MM PS-2 3/8 CIR PRIM Y427H

## (undated) DEVICE — ZIMMER® STERILE DISPOSABLE TOURNIQUET CUFF WITH PLC, DUAL PORT, SINGLE BLADDER, 30 IN. (76 CM)

## (undated) DEVICE — T-DRAPE,EXTREMITY,STERILE: Brand: MEDLINE

## (undated) DEVICE — 7 DAY SILVER-COATED ANTIMICROBIAL BARRIER DRESSING: Brand: ACTICOAT 7  4" X 5"

## (undated) DEVICE — SET IRRIG W 96IN TBNG 4 LN FLX BG

## (undated) DEVICE — BNDG,ELSTC,MATRIX,STRL,4"X5YD,LF,HOOK&LP: Brand: MEDLINE

## (undated) DEVICE — SOFT SILICONE HYDROCELLULAR FOAM DRESSING WITH LOCK AWAY LAYER: Brand: ALLEVYN LIFE XL 21X21 CTN10

## (undated) DEVICE — BANDAGE COMPR SELF ADH 5 YDX6 IN TAN STRL PREMIERPRO LF

## (undated) DEVICE — K WIRE FIX L285MM DIA2.5MM S STL W/ TRCR PNT
Type: IMPLANTABLE DEVICE | Site: HUMERUS | Status: NON-FUNCTIONAL
Removed: 2024-01-26

## (undated) DEVICE — SUTURE MCRYL SZ 2-0 L27IN ABSRB UD SH L26MM TAPERPOINT NDL Y417H

## (undated) DEVICE — SUTURE VCRL SZ 2-0 L18IN ABSRB VLT L26MM CT-2 1/2 CIR J726D

## (undated) DEVICE — SCD SEQUENTIAL COMPRESSION COMFORT SLEEVE MEDIUM KNEE LENGTH: Brand: KENDALL SCD

## (undated) DEVICE — WEREWOLF FLOW 50 COBLATION WAND: Brand: COBLATION

## (undated) DEVICE — YANKAUER,BULB TIP,W/O VENT,RIGID,STERILE: Brand: MEDLINE

## (undated) DEVICE — GUARDIAN LVC: Brand: GUARDIAN

## (undated) DEVICE — DRAPE,U/SHT,SPLIT,FILM,60X84,STERILE: Brand: MEDLINE

## (undated) DEVICE — SUTURE ETHBND EXCEL SZ 0 L30IN NONABSORBABLE GRN L26MM CT-2 X412H

## (undated) DEVICE — ROD RMR L650MM DIA2.5MM W/ EXTN BALL TIP

## (undated) DEVICE — GLOVE SURG SZ 8 L12IN FNGR THK79MIL GRN LTX FREE

## (undated) DEVICE — DRAPE,HAND,STERILE: Brand: MEDLINE

## (undated) DEVICE — BIT DRL L330MM DIA3.2MM CALIB L100MM NONSTERILE 3 FLUT QUIK

## (undated) DEVICE — STERILE HOOK LOCK LATEX FREE ELASTIC BANDAGE 6INX5YD: Brand: HOOK LOCK™